# Patient Record
Sex: FEMALE
[De-identification: names, ages, dates, MRNs, and addresses within clinical notes are randomized per-mention and may not be internally consistent; named-entity substitution may affect disease eponyms.]

---

## 2021-07-27 ENCOUNTER — NURSE TRIAGE (OUTPATIENT)
Dept: OTHER | Facility: CLINIC | Age: 41
End: 2021-07-27

## 2021-07-27 NOTE — TELEPHONE ENCOUNTER
Brief description of triage: Caller complaints of pain, swelling and bruising in left and right shin, and right ankle and foot from a fall up a stair. Her shin struck harder on the right and it left a small bruise to each shin, then later swelling that became a bruise within the week. Triage indicates for patient to see PCP today. Caller has no PCP and was referred to [facility] today. Care advice provided, patient verbalizes understanding; denies any other questions or concerns; instructed to call back for any new or worsening symptoms. This triage is a result of a call to [address] [address]. Please do not respond to the triage nurse through this encounter. Any subsequent communication should be directly with the patient. Reason for Disposition   Patient wants to be seen    Answer Assessment - Initial Assessment Questions  1. MECHANISM: \"How did the injury happen? \" (e.g., twisting injury, direct blow)       A fall up the stairs and struck her shins on the edge of the stair    2. ONSET: \"When did the injury happen? \" (Minutes or hours ago)       1 week    3. LOCATION: \"Where is the injury located? \"       Right ankle and foot     4. APPEARANCE of INJURY: \"What does the injury look like? \"       Bruising, swollen and painful. New bruising on lateral side of right ankle    5. WEIGHT-BEARING: \"Can you put weight on that foot? \" \"Can you walk (four steps or more)? \"        Yes, pain is on the shin or ankle when applying direct pressure    6. SIZE: For cuts, bruises, or swelling, ask: \"How large is it? \" (e.g., inches or centimeters;  entire joint)       Bruising on right and left shin, right ankle and foot have multiple bruising    7. PAIN: \"Is there pain? \" If so, ask: \"How bad is the pain? \"    (e.g., Scale 1-10; or mild, moderate, severe)      3 mild pain    8. TETANUS: For any breaks in the skin, ask: \"When was the last tetanus booster? \"      N/a    9. OTHER SYMPTOMS: \"Do you have any other symptoms? \"       Denies    10. PREGNANCY: \"Is there any chance you are pregnant? \" \"When was your last menstrual period? \"        No    Protocols used: ANKLE AND FOOT INJURY-ADULT-[place]

## 2024-08-29 NOTE — PROGRESS NOTES
Subjective   Patient ID: Mabel Benton is a 44 y.o. female who presents for No chief complaint on file..  HPI  Pt presents for annual exam.  Pt states that had hysterectomy.  Still has right ovary.  May be having some hot flashes.  Pt would like to get into a FM doctor for BP issues and any other problems she might have.  No other problems.     Review of Systems  all other symptoms were found to be neg except for HPI/CC.      Objective   Physical Exam    General  General Appearance - normal build and Well groomed, Not in acute distress, No acute respiratory distress.  Mental Status - Alert.    Integumentary  - - warm and dry with no rashes.    Head and Neck  - - normalocephalic.    Eye  - - Bilateral - pupils equal and round and sclera clear.    Chest and Lung Exam  - - Bilateral - normal breathing effort.    Musculoskeletal  - - normal posture and normal gait and station.    Assessment/Plan   Diagnoses and all orders for this visit:  Women's annual routine gynecological examination  Screening mammogram for breast cancer  -     BI mammo bilateral screening tomosynthesis; Future  Screening for cholesterol level  -     Lipid Panel; Future  Screening for endocrine disorder  -     Comprehensive Metabolic Panel; Future  -     Hemoglobin A1C; Future  -     CBC; Future  -     TSH with reflex to Free T4 if abnormal; Future     Gave info on FM doctors  Pt to take BP's at home and then bring to FM appt  Ordered blood work  Ordered mamm  Pt is to F/U in 1 yr for annual exam or PRN.  Pt is to call with any questions of concerns.     Sharon Smith,  08/29/24 5:35 PM

## 2024-08-30 ENCOUNTER — OFFICE VISIT (OUTPATIENT)
Dept: OBSTETRICS AND GYNECOLOGY | Facility: CLINIC | Age: 44
End: 2024-08-30
Payer: COMMERCIAL

## 2024-08-30 VITALS
WEIGHT: 267.8 LBS | DIASTOLIC BLOOD PRESSURE: 128 MMHG | SYSTOLIC BLOOD PRESSURE: 167 MMHG | HEIGHT: 66 IN | BODY MASS INDEX: 43.04 KG/M2

## 2024-08-30 DIAGNOSIS — Z13.29 SCREENING FOR ENDOCRINE DISORDER: ICD-10-CM

## 2024-08-30 DIAGNOSIS — Z13.220 SCREENING FOR CHOLESTEROL LEVEL: ICD-10-CM

## 2024-08-30 DIAGNOSIS — Z12.31 SCREENING MAMMOGRAM FOR BREAST CANCER: ICD-10-CM

## 2024-08-30 DIAGNOSIS — N95.1 MENOPAUSAL SYNDROME: ICD-10-CM

## 2024-08-30 DIAGNOSIS — Z01.419 WOMEN'S ANNUAL ROUTINE GYNECOLOGICAL EXAMINATION: Primary | ICD-10-CM

## 2024-08-30 RX ORDER — DICLOFENAC SODIUM 75 MG/1
75 TABLET, DELAYED RELEASE ORAL 2 TIMES DAILY
COMMUNITY

## 2024-09-23 ENCOUNTER — LAB (OUTPATIENT)
Dept: LAB | Facility: LAB | Age: 44
End: 2024-09-23
Payer: COMMERCIAL

## 2024-09-23 DIAGNOSIS — Z13.220 SCREENING FOR CHOLESTEROL LEVEL: ICD-10-CM

## 2024-09-23 DIAGNOSIS — Z13.29 SCREENING FOR ENDOCRINE DISORDER: ICD-10-CM

## 2024-09-23 LAB
ALBUMIN SERPL BCP-MCNC: 4.1 G/DL (ref 3.4–5)
ALP SERPL-CCNC: 52 U/L (ref 33–110)
ALT SERPL W P-5'-P-CCNC: 8 U/L (ref 7–45)
ANION GAP SERPL CALC-SCNC: 13 MMOL/L (ref 10–20)
AST SERPL W P-5'-P-CCNC: 9 U/L (ref 9–39)
BILIRUB SERPL-MCNC: 0.5 MG/DL (ref 0–1.2)
BUN SERPL-MCNC: 11 MG/DL (ref 6–23)
CALCIUM SERPL-MCNC: 9.5 MG/DL (ref 8.6–10.6)
CHLORIDE SERPL-SCNC: 105 MMOL/L (ref 98–107)
CHOLEST SERPL-MCNC: 228 MG/DL (ref 0–199)
CHOLESTEROL/HDL RATIO: 4.9
CO2 SERPL-SCNC: 24 MMOL/L (ref 21–32)
CREAT SERPL-MCNC: 0.67 MG/DL (ref 0.5–1.05)
EGFRCR SERPLBLD CKD-EPI 2021: >90 ML/MIN/1.73M*2
ERYTHROCYTE [DISTWIDTH] IN BLOOD BY AUTOMATED COUNT: 12.9 % (ref 11.5–14.5)
EST. AVERAGE GLUCOSE BLD GHB EST-MCNC: 103 MG/DL
GLUCOSE SERPL-MCNC: 98 MG/DL (ref 74–99)
HBA1C MFR BLD: 5.2 %
HCT VFR BLD AUTO: 39.9 % (ref 36–46)
HDLC SERPL-MCNC: 46.7 MG/DL
HGB BLD-MCNC: 12.8 G/DL (ref 12–16)
LDLC SERPL CALC-MCNC: 131 MG/DL
MCH RBC QN AUTO: 27.8 PG (ref 26–34)
MCHC RBC AUTO-ENTMCNC: 32.1 G/DL (ref 32–36)
MCV RBC AUTO: 87 FL (ref 80–100)
NON HDL CHOLESTEROL: 181 MG/DL (ref 0–149)
NRBC BLD-RTO: 0 /100 WBCS (ref 0–0)
PLATELET # BLD AUTO: 397 X10*3/UL (ref 150–450)
POTASSIUM SERPL-SCNC: 4.4 MMOL/L (ref 3.5–5.3)
PROT SERPL-MCNC: 6.9 G/DL (ref 6.4–8.2)
RBC # BLD AUTO: 4.61 X10*6/UL (ref 4–5.2)
SODIUM SERPL-SCNC: 138 MMOL/L (ref 136–145)
TRIGL SERPL-MCNC: 253 MG/DL (ref 0–149)
TSH SERPL-ACNC: 0.94 MIU/L (ref 0.44–3.98)
VLDL: 51 MG/DL (ref 0–40)
WBC # BLD AUTO: 7.6 X10*3/UL (ref 4.4–11.3)

## 2024-09-23 PROCEDURE — 83036 HEMOGLOBIN GLYCOSYLATED A1C: CPT

## 2024-09-23 PROCEDURE — 85027 COMPLETE CBC AUTOMATED: CPT

## 2024-09-23 PROCEDURE — 84443 ASSAY THYROID STIM HORMONE: CPT

## 2024-09-23 PROCEDURE — 80053 COMPREHEN METABOLIC PANEL: CPT

## 2024-09-23 PROCEDURE — 36415 COLL VENOUS BLD VENIPUNCTURE: CPT

## 2024-09-23 PROCEDURE — 80061 LIPID PANEL: CPT

## 2024-09-24 ENCOUNTER — APPOINTMENT (OUTPATIENT)
Dept: PRIMARY CARE | Facility: CLINIC | Age: 44
End: 2024-09-24
Payer: COMMERCIAL

## 2024-09-25 ENCOUNTER — APPOINTMENT (OUTPATIENT)
Dept: PRIMARY CARE | Facility: CLINIC | Age: 44
End: 2024-09-25
Payer: COMMERCIAL

## 2024-09-25 ENCOUNTER — HOSPITAL ENCOUNTER (OUTPATIENT)
Dept: RADIOLOGY | Facility: CLINIC | Age: 44
Discharge: HOME | End: 2024-09-25
Payer: COMMERCIAL

## 2024-09-25 ENCOUNTER — LAB (OUTPATIENT)
Dept: LAB | Facility: LAB | Age: 44
End: 2024-09-25
Payer: COMMERCIAL

## 2024-09-25 VITALS
TEMPERATURE: 98.1 F | SYSTOLIC BLOOD PRESSURE: 152 MMHG | HEART RATE: 64 BPM | WEIGHT: 265 LBS | RESPIRATION RATE: 20 BRPM | BODY MASS INDEX: 42.59 KG/M2 | DIASTOLIC BLOOD PRESSURE: 102 MMHG | HEIGHT: 66 IN

## 2024-09-25 DIAGNOSIS — R05.3 PERSISTENT COUGH: ICD-10-CM

## 2024-09-25 DIAGNOSIS — I10 PRIMARY HYPERTENSION: ICD-10-CM

## 2024-09-25 DIAGNOSIS — E78.2 MIXED HYPERLIPIDEMIA: ICD-10-CM

## 2024-09-25 DIAGNOSIS — Z23 NEED FOR VACCINATION: ICD-10-CM

## 2024-09-25 DIAGNOSIS — Z13.31 SCREENING FOR DEPRESSION: ICD-10-CM

## 2024-09-25 DIAGNOSIS — Z00.00 ENCOUNTER FOR ANNUAL PHYSICAL EXAM: Primary | ICD-10-CM

## 2024-09-25 DIAGNOSIS — Z00.00 ENCOUNTER FOR ANNUAL PHYSICAL EXAM: ICD-10-CM

## 2024-09-25 DIAGNOSIS — E66.01 MORBID OBESITY WITH BMI OF 40.0-44.9, ADULT (MULTI): ICD-10-CM

## 2024-09-25 PROBLEM — H53.9 VISUAL DISTURBANCE: Status: RESOLVED | Noted: 2024-09-25 | Resolved: 2024-09-25
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
## 2024-09-25 PROBLEM — J04.0 LARYNGITIS: Status: RESOLVED | Noted: 2024-09-25 | Resolved: 2024-09-25

## 2024-09-25 PROBLEM — G43.109 OCULAR MIGRAINE: Status: ACTIVE | Noted: 2024-09-25

## 2024-09-25 PROBLEM — R51.9 HEADACHE: Status: RESOLVED | Noted: 2018-10-13 | Resolved: 2024-09-25

## 2024-09-25 PROBLEM — H52.7 REFRACTIVE ERROR: Status: RESOLVED | Noted: 2024-09-25 | Resolved: 2024-09-25

## 2024-09-25 PROBLEM — R03.0 ELEVATED BLOOD PRESSURE READING IN OFFICE WITHOUT DIAGNOSIS OF HYPERTENSION: Status: ACTIVE | Noted: 2024-09-25

## 2024-09-25 PROBLEM — M25.562 LEFT KNEE PAIN: Status: RESOLVED | Noted: 2024-07-15 | Resolved: 2024-09-25

## 2024-09-25 PROBLEM — G43.109 OCULAR MIGRAINE: Status: RESOLVED | Noted: 2024-09-25 | Resolved: 2024-09-25

## 2024-09-25 PROBLEM — S80.10XA CONTUSION OF LOWER LEG: Status: RESOLVED | Noted: 2024-09-25 | Resolved: 2024-09-25

## 2024-09-25 LAB — 25(OH)D3 SERPL-MCNC: 10 NG/ML (ref 30–100)

## 2024-09-25 PROCEDURE — 90472 IMMUNIZATION ADMIN EACH ADD: CPT | Performed by: FAMILY MEDICINE

## 2024-09-25 PROCEDURE — 96127 BRIEF EMOTIONAL/BEHAV ASSMT: CPT | Performed by: FAMILY MEDICINE

## 2024-09-25 PROCEDURE — 90471 IMMUNIZATION ADMIN: CPT | Performed by: FAMILY MEDICINE

## 2024-09-25 PROCEDURE — 36415 COLL VENOUS BLD VENIPUNCTURE: CPT

## 2024-09-25 PROCEDURE — 90715 TDAP VACCINE 7 YRS/> IM: CPT | Performed by: FAMILY MEDICINE

## 2024-09-25 PROCEDURE — 82306 VITAMIN D 25 HYDROXY: CPT

## 2024-09-25 PROCEDURE — 99386 PREV VISIT NEW AGE 40-64: CPT | Performed by: FAMILY MEDICINE

## 2024-09-25 PROCEDURE — 71046 X-RAY EXAM CHEST 2 VIEWS: CPT | Performed by: RADIOLOGY

## 2024-09-25 PROCEDURE — 99203 OFFICE O/P NEW LOW 30 MIN: CPT | Performed by: FAMILY MEDICINE

## 2024-09-25 PROCEDURE — 90656 IIV3 VACC NO PRSV 0.5 ML IM: CPT | Performed by: FAMILY MEDICINE

## 2024-09-25 PROCEDURE — 71046 X-RAY EXAM CHEST 2 VIEWS: CPT

## 2024-09-25 RX ORDER — BENZONATATE 100 MG/1
100 CAPSULE ORAL 3 TIMES DAILY PRN
Qty: 42 CAPSULE | Refills: 0 | Status: SHIPPED | OUTPATIENT
Start: 2024-09-25 | End: 2024-10-25

## 2024-09-25 RX ORDER — LISINOPRIL 10 MG/1
10 TABLET ORAL DAILY
Qty: 90 TABLET | Refills: 0 | Status: SHIPPED | OUTPATIENT
Start: 2024-09-25 | End: 2024-12-24

## 2024-09-25 ASSESSMENT — ENCOUNTER SYMPTOMS
COUGH: 1
SEIZURES: 0
FATIGUE: 0
SORE THROAT: 0
RHINORRHEA: 0
FEVER: 0
DIARRHEA: 0
NAUSEA: 0
DYSPHORIC MOOD: 0
DIFFICULTY URINATING: 0
NERVOUS/ANXIOUS: 0
WHEEZING: 0
HEMATURIA: 0
CONSTIPATION: 0
VOMITING: 0
PALPITATIONS: 0
ARTHRALGIAS: 1
BLOOD IN STOOL: 0
BRUISES/BLEEDS EASILY: 0
SHORTNESS OF BREATH: 1

## 2024-09-25 ASSESSMENT — PATIENT HEALTH QUESTIONNAIRE - PHQ9
2. FEELING DOWN, DEPRESSED OR HOPELESS: NOT AT ALL
1. LITTLE INTEREST OR PLEASURE IN DOING THINGS: NOT AT ALL
SUM OF ALL RESPONSES TO PHQ9 QUESTIONS 1 & 2: 0

## 2024-09-25 NOTE — PROGRESS NOTES
"Subjective   Patient ID: Mabel Benton is a 44 y.o. female who presents for Hypertension (Np. Pts BP has been elevated. She stopped the diclofenac for her knee to see if it was causing the high bp. Her labs that GYN recently ordered showed high cholesterol.) and Cough (Cough for about a month. Sometimes wet but can't cough it up. Pt feels like something is in her lungs. Worse when she lays down. She has been SOB/winded about a month weeks before the cough started. ).    HPI     Review of Systems   Constitutional:  Negative for fatigue and fever.   HENT:  Negative for congestion, ear discharge, ear pain, hearing loss, rhinorrhea and sore throat.    Eyes:  Negative for visual disturbance.   Respiratory:  Positive for cough and shortness of breath. Negative for wheezing.         Duration 2 wk cough  Home covid negative   Cardiovascular:  Negative for chest pain and palpitations.   Gastrointestinal:  Negative for blood in stool, constipation, diarrhea, nausea and vomiting.   Endocrine: Negative for cold intolerance and heat intolerance.   Genitourinary:  Negative for difficulty urinating, hematuria, vaginal bleeding and vaginal discharge.   Musculoskeletal:  Positive for arthralgias.        Left knee pain ,sees ortho   Skin:  Negative for rash.   Neurological:  Negative for seizures and syncope.        Pt fainted due to anemia, remotely   Hematological:  Does not bruise/bleed easily.   Psychiatric/Behavioral:  Negative for dysphoric mood and suicidal ideas. The patient is not nervous/anxious.        Objective   BP (!) 152/102   Pulse 64   Temp 36.7 °C (98.1 °F)   Resp 20   Ht 1.664 m (5' 5.5\")   Wt 120 kg (265 lb)   BMI 43.43 kg/m²     Physical Exam  Vitals and nursing note reviewed.   Constitutional:       General: She is not in acute distress.     Appearance: She is obese.   HENT:      Head: Normocephalic and atraumatic.      Right Ear: Tympanic membrane, ear canal and external ear normal.      Left Ear: " Tympanic membrane, ear canal and external ear normal.      Nose: Nose normal.      Mouth/Throat:      Mouth: Mucous membranes are moist.      Pharynx: Oropharynx is clear.   Eyes:      Extraocular Movements: Extraocular movements intact.      Conjunctiva/sclera: Conjunctivae normal.      Pupils: Pupils are equal, round, and reactive to light.   Cardiovascular:      Rate and Rhythm: Normal rate and regular rhythm.      Heart sounds: Normal heart sounds.   Pulmonary:      Effort: Pulmonary effort is normal. No respiratory distress.      Breath sounds: Normal breath sounds. No wheezing, rhonchi or rales.   Abdominal:      General: Bowel sounds are normal.      Palpations: Abdomen is soft. There is no mass.      Tenderness: There is no abdominal tenderness. There is no right CVA tenderness or left CVA tenderness.   Musculoskeletal:         General: No deformity.      Cervical back: Neck supple.   Lymphadenopathy:      Cervical: No cervical adenopathy.   Skin:     General: Skin is warm and dry.   Neurological:      General: No focal deficit present.      Mental Status: She is alert.      Sensory: No sensory deficit.      Motor: No weakness.      Gait: Gait normal.   Psychiatric:         Mood and Affect: Mood normal.         Behavior: Behavior normal.         Assessment/Plan   Problem List Items Addressed This Visit             ICD-10-CM    Encounter for annual physical exam - Primary Z00.00     Pt ob gyn ordered bw, will check vit d         Relevant Orders    Vitamin D 25-Hydroxy,Total    Follow Up In [facility] - PCP - Health Maintenance    Screening for depression Z13.31    Morbid obesity with BMI of 40.0-44.9, adult (Multi) E66.01, Z68.41     Advise healthy diet and exercise  Ho printed         Need for vaccination Z23    Relevant Orders    Flu vaccine, trivalent, preservative free, age 6 months and greater (Fluraix/Fluzone/Flulaval)    Tdap vaccine, age 7 years and older    Primary hypertension I10     Pt  stopped nsaid  Pt with hx of htn  Home bp has been elevated  Will start lisinopril 10 mg daily  F/up 1 month         Relevant Medications    lisinopril 10 mg tablet    Other Relevant Orders    Follow Up In [facility] - PCP - Established    Persistent cough R05.3    Relevant Medications    benzonatate (Tessalon) 100 mg capsule    Other Relevant Orders    XR chest 2 views    Mixed hyperlipidemia E78.2     Pt to start a low fat/chol diet  Exercise most days   Will recheck lipids in 6 mo

## 2024-09-25 NOTE — ASSESSMENT & PLAN NOTE
Pt stopped nsaid  Pt with hx of htn  Home bp has been elevated  Will start lisinopril 10 mg daily  F/up 1 month

## 2024-09-27 ENCOUNTER — TELEPHONE (OUTPATIENT)
Dept: PRIMARY CARE | Facility: CLINIC | Age: 44
End: 2024-09-27
Payer: COMMERCIAL

## 2024-09-27 DIAGNOSIS — I51.7 CARDIOMEGALY: ICD-10-CM

## 2024-09-27 DIAGNOSIS — E55.9 VITAMIN D DEFICIENCY: ICD-10-CM

## 2024-09-27 NOTE — TELEPHONE ENCOUNTER
Can you please help pt schedule ECHO. I placed the order. Also, she will have to get blood drawn again because the lab could not add the additional blood test to the tubes they already jose alberto.

## 2024-09-27 NOTE — TELEPHONE ENCOUNTER
----- Message from Portia Astudillo sent at 9/27/2024  7:40 AM EDT -----  Call pt, cxr showed no lung abn, has mild cardiomegaly which can be due to uncontroled htn, check BNP and echo

## 2024-09-27 NOTE — TELEPHONE ENCOUNTER
----- Message from Portia Astudillo sent at 9/26/2024  8:44 AM EDT -----  PT WITH LOW VIT D. START VIT D 50,000 international units  WEEKLY X 12 WK. THEN SWITCH TO OTC VIT D 400 international units  DAILY

## 2024-09-28 RX ORDER — ERGOCALCIFEROL 1.25 MG/1
50000 CAPSULE ORAL WEEKLY
Qty: 12 CAPSULE | Refills: 0 | Status: SHIPPED | OUTPATIENT
Start: 2024-09-28 | End: 2024-12-21

## 2024-10-01 ENCOUNTER — APPOINTMENT (OUTPATIENT)
Dept: PRIMARY CARE | Facility: CLINIC | Age: 44
End: 2024-10-01
Payer: COMMERCIAL

## 2024-10-09 ENCOUNTER — LAB (OUTPATIENT)
Dept: LAB | Facility: LAB | Age: 44
End: 2024-10-09
Payer: COMMERCIAL

## 2024-10-09 DIAGNOSIS — I51.7 CARDIOMEGALY: ICD-10-CM

## 2024-10-09 LAB — BNP SERPL-MCNC: 72 PG/ML (ref 0–99)

## 2024-10-09 PROCEDURE — 36415 COLL VENOUS BLD VENIPUNCTURE: CPT

## 2024-10-09 PROCEDURE — 83880 ASSAY OF NATRIURETIC PEPTIDE: CPT

## 2024-10-17 DIAGNOSIS — I10 PRIMARY HYPERTENSION: ICD-10-CM

## 2024-10-18 RX ORDER — LISINOPRIL 10 MG/1
10 TABLET ORAL DAILY
Qty: 90 TABLET | Refills: 1 | Status: SHIPPED | OUTPATIENT
Start: 2024-10-18

## 2024-10-20 DIAGNOSIS — E55.9 VITAMIN D DEFICIENCY: ICD-10-CM

## 2024-10-20 DIAGNOSIS — R93.1 ABNORMAL ECHOCARDIOGRAM: ICD-10-CM

## 2024-10-22 ENCOUNTER — HOSPITAL ENCOUNTER (OUTPATIENT)
Dept: CARDIOLOGY | Facility: CLINIC | Age: 44
Discharge: HOME | End: 2024-10-22
Payer: COMMERCIAL

## 2024-10-22 DIAGNOSIS — I51.7 CARDIOMEGALY: ICD-10-CM

## 2024-10-22 LAB
AORTIC VALVE PEAK VELOCITY: 1.35 M/S
AV PEAK GRADIENT: 7.3 MMHG
AVA (PEAK VEL): 3.83 CM2
EJECTION FRACTION APICAL 4 CHAMBER: 47.8
EJECTION FRACTION: 41 %
LEFT ATRIUM VOLUME AREA LENGTH INDEX BSA: 30.4 ML/M2
LEFT VENTRICLE INTERNAL DIMENSION DIASTOLE: 5.26 CM (ref 3.5–6)
LEFT VENTRICULAR OUTFLOW TRACT DIAMETER: 2.38 CM
MITRAL VALVE E/A RATIO: 0.85
RIGHT VENTRICLE FREE WALL PEAK S': 12 CM/S
RIGHT VENTRICLE PEAK SYSTOLIC PRESSURE: 32.5 MMHG
TRICUSPID ANNULAR PLANE SYSTOLIC EXCURSION: 2.3 CM

## 2024-10-22 PROCEDURE — 93325 DOPPLER ECHO COLOR FLOW MAPG: CPT

## 2024-10-22 PROCEDURE — 93321 DOPPLER ECHO F-UP/LMTD STD: CPT | Performed by: INTERNAL MEDICINE

## 2024-10-22 PROCEDURE — 93325 DOPPLER ECHO COLOR FLOW MAPG: CPT | Performed by: INTERNAL MEDICINE

## 2024-10-22 PROCEDURE — 93308 TTE F-UP OR LMTD: CPT | Performed by: INTERNAL MEDICINE

## 2024-10-23 RX ORDER — ERGOCALCIFEROL 1.25 MG/1
50000 CAPSULE ORAL
Qty: 24 CAPSULE | Refills: 1 | OUTPATIENT
Start: 2024-10-27

## 2024-10-30 ENCOUNTER — APPOINTMENT (OUTPATIENT)
Dept: PRIMARY CARE | Facility: CLINIC | Age: 44
End: 2024-10-30
Payer: COMMERCIAL

## 2024-10-30 VITALS
TEMPERATURE: 98.2 F | BODY MASS INDEX: 40.82 KG/M2 | RESPIRATION RATE: 20 BRPM | HEIGHT: 66 IN | SYSTOLIC BLOOD PRESSURE: 112 MMHG | HEART RATE: 84 BPM | WEIGHT: 254 LBS | DIASTOLIC BLOOD PRESSURE: 80 MMHG

## 2024-10-30 DIAGNOSIS — I10 PRIMARY HYPERTENSION: Primary | ICD-10-CM

## 2024-10-30 PROCEDURE — 99213 OFFICE O/P EST LOW 20 MIN: CPT | Performed by: FAMILY MEDICINE

## 2024-10-30 RX ORDER — ACETAMINOPHEN 500 MG
TABLET ORAL
Qty: 1 KIT | Refills: 0 | Status: SHIPPED | OUTPATIENT
Start: 2024-10-30

## 2024-10-30 ASSESSMENT — ENCOUNTER SYMPTOMS
DIFFICULTY URINATING: 0
DIZZINESS: 0
CONSTIPATION: 0
LIGHT-HEADEDNESS: 0
FATIGUE: 0
SHORTNESS OF BREATH: 0
VOMITING: 0
DIARRHEA: 0
NAUSEA: 0
PALPITATIONS: 0

## 2024-12-13 ENCOUNTER — APPOINTMENT (OUTPATIENT)
Dept: CARDIOLOGY | Facility: CLINIC | Age: 44
End: 2024-12-13
Payer: COMMERCIAL

## 2024-12-13 VITALS
SYSTOLIC BLOOD PRESSURE: 124 MMHG | HEART RATE: 94 BPM | BODY MASS INDEX: 40.34 KG/M2 | HEIGHT: 66 IN | WEIGHT: 251 LBS | DIASTOLIC BLOOD PRESSURE: 82 MMHG

## 2024-12-13 DIAGNOSIS — I10 PRIMARY HYPERTENSION: ICD-10-CM

## 2024-12-13 DIAGNOSIS — I42.9 CARDIOMYOPATHY, UNSPECIFIED TYPE (MULTI): ICD-10-CM

## 2024-12-13 DIAGNOSIS — I51.7 CARDIOMEGALY: Primary | ICD-10-CM

## 2024-12-13 DIAGNOSIS — E78.2 MIXED HYPERLIPIDEMIA: ICD-10-CM

## 2024-12-13 DIAGNOSIS — E78.5 DYSLIPIDEMIA: ICD-10-CM

## 2024-12-13 DIAGNOSIS — R94.31 ABNORMAL EKG: ICD-10-CM

## 2024-12-13 PROBLEM — Z23 NEED FOR VACCINATION: Status: RESOLVED | Noted: 2024-09-25 | Resolved: 2024-12-13

## 2024-12-13 PROCEDURE — 1036F TOBACCO NON-USER: CPT | Performed by: INTERNAL MEDICINE

## 2024-12-13 PROCEDURE — 3079F DIAST BP 80-89 MM HG: CPT | Performed by: INTERNAL MEDICINE

## 2024-12-13 PROCEDURE — 99205 OFFICE O/P NEW HI 60 MIN: CPT | Performed by: INTERNAL MEDICINE

## 2024-12-13 PROCEDURE — 3008F BODY MASS INDEX DOCD: CPT | Performed by: INTERNAL MEDICINE

## 2024-12-13 PROCEDURE — 93000 ELECTROCARDIOGRAM COMPLETE: CPT | Performed by: INTERNAL MEDICINE

## 2024-12-13 PROCEDURE — 3074F SYST BP LT 130 MM HG: CPT | Performed by: INTERNAL MEDICINE

## 2024-12-13 RX ORDER — METOPROLOL SUCCINATE 25 MG/1
25 TABLET, EXTENDED RELEASE ORAL DAILY
Qty: 90 TABLET | Refills: 3 | Status: SHIPPED | OUTPATIENT
Start: 2024-12-13 | End: 2025-12-13

## 2024-12-13 RX ORDER — BISMUTH SUBSALICYLATE 262 MG
1 TABLET,CHEWABLE ORAL DAILY
COMMUNITY

## 2024-12-13 RX ORDER — OMEGA-3S/DHA/EPA/FISH OIL 1000-1400
CAPSULE,DELAYED RELEASE (ENTERIC COATED) ORAL
COMMUNITY

## 2024-12-13 ASSESSMENT — ENCOUNTER SYMPTOMS
PALPITATIONS: 1
ARTHRALGIAS: 1
GASTROINTESTINAL NEGATIVE: 1

## 2024-12-13 NOTE — PROGRESS NOTES
Patient:  Mabel Benton  YOB: 1980  MRN: [id number]       Chief Complaint/Active Symptoms:       Mabel Benton is a 44 y.o. female who returns today for cardiac follow-up.          Review of Systems    Objective:     Vitals:    12/13/24 0817   BP: 124/82   Pulse: 94       Vitals:    12/13/24 0817   Weight: 114 kg (251 lb)       Allergies:     No Known Allergies       Medications:     Current Outpatient Medications   Medication Instructions   • blood pressure monitor kit Take bp readings 2-3 x/wk initially , when stable, take bp 1x/wk   • ergocalciferol (VITAMIN D-2) 50,000 Units, oral, Weekly   • inulin (Fiber Gummies) 2 gram tablet,chewable Chew.   • lisinopril 10 mg, oral, Daily   • multivitamin tablet 1 tablet, Daily       Physical Examination:   GENERAL:  Well developed, well nourished, in no acute distress.  HEENT: NC AT, EOMI with anicteric sclera  NECK:  Supple, no JVD, no bruit.  CHEST:  Symmetric and nontender.  LUNGS:  Clear to auscultation bilaterally, normal respiratory effort.  HEART:  PMI is nondisplaced. RRR with normal S1 and S2, no S3, no mumur or rub. No carotid or abdominal bruits  ABDOMEN: Soft, NT, ND without palpable organomegaly or bruits  EXTREMITIES:  Warm with good color, no clubbing or cyanosis.  There is no edema noted.  PERIPHERAL VASCULAR:  Pulses present and equally palpable; 2+ throughout.  MUSCULOSKELETAL:   NEURO/PSYCH:  Alert and oriented times three with approppriate behavior and responses. Nonfocal motor examination with normal gait and ambulation  Lymph: No significant palpable lymphadenopathy  Skin: no rash or lesions on exposed skin or reported.    Lab:     CBC:   Lab Results   Component Value Date    WBC 7.6 09/23/2024    RBC 4.61 09/23/2024    HGB 12.8 09/23/2024    HCT 39.9 09/23/2024     09/23/2024        CMP:    Lab Results   Component Value Date     09/23/2024    K 4.4 09/23/2024     09/23/2024    CO2 24 09/23/2024    BUN 11  "09/23/2024    CREATININE 0.67 09/23/2024    GLUCOSE 98 09/23/2024    CALCIUM 9.5 09/23/2024       Magnesium:    No results found for: \"MG\"    Lipid Profile:    Lab Results   Component Value Date    TRIG 253 (H) 09/23/2024    HDL 46.7 09/23/2024    LDLCALC 131 (H) 09/23/2024       TSH:    Lab Results   Component Value Date    TSH 0.94 09/23/2024       BNP:   Lab Results   Component Value Date    BNP 72 10/09/2024        PT/INR:    No results found for: \"PROTIME\", \"INR\"    HgBA1c:    Lab Results   Component Value Date    HGBA1C 5.2 09/23/2024       BMP:  Lab Results   Component Value Date     09/23/2024     05/30/2019     05/21/2019    K 4.4 09/23/2024    K 4.5 05/30/2019    K 4.4 05/21/2019     09/23/2024     05/30/2019     05/21/2019    CO2 24 09/23/2024    CO2 22 05/30/2019    CO2 25 05/21/2019    BUN 11 09/23/2024    BUN 12 05/30/2019    BUN 12 05/21/2019    CREATININE 0.67 09/23/2024    CREATININE 0.77 05/30/2019    CREATININE 0.61 05/21/2019       Cardiac Enzymes:    No results found for: \"TROPHS\"    Hepatic Function Panel:    Lab Results   Component Value Date    ALKPHOS 52 09/23/2024    ALT 8 09/23/2024    AST 9 09/23/2024    PROT 6.9 09/23/2024    BILITOT 0.5 09/23/2024         Diagnostic Studies:     Transthoracic Echo Limited    Result Date: 10/22/2024   [facility], [address] [place] [address]             Tel [phone] and Fax [phone] TRANSTHORACIC ECHOCARDIOGRAM REPORT  Patient Name:      KRAIG Veronica Physician:    [id number] Juan Pablo Neff MD Study Date:        10/22/2024           Ordering Provider:    [id number] SAMMY KENT MRN/PID:           [id number]             Fellow: Accession#:        [id number]         Nurse: Date of Birth/Age: 1980 / 44 years Sonographer:          Kellen Babb RDCS Gender:         "    F                    Additional Staff: Height:            165.10 cm            Admit Date: Weight:            120.20 kg            Admission Status:     Outpatient BSA / BMI:         2.23 m2 / 44.10      Encounter#:           [id number]                    kg/m2 Blood Pressure:    152/89 mmHg          Department Location:  [facility] Study Type:    TRANSTHORACIC ECHO (TTE) LIMITED Diagnosis/ICD: Ventricular enlargement-I51.7 Indication:    Cardiomegaly CPT Code:      Echo Limited-74591; Color Doppler-50313; Doppler Limited-95721 Patient History: Pertinent History: HTN and Hyperlipidemia. obesity. Study Detail: The following Echo studies were performed: 2D, M-Mode, Doppler and               color flow. Technically challenging study due to body habitus.  PHYSICIAN INTERPRETATION: Left Ventricle: The left ventricular systolic function is mildly decreased, with a Bah's biplane calculated ejection fraction of 41%. There is global hypokinesis of the left ventricle with minor regional variations. The left ventricular cavity size is mildly dilated. Spectral Doppler shows a normal pattern of left ventricular diastolic filling. Left Atrium: The left atrium is normal in size. Right Ventricle: The right ventricle is normal in size. There is normal right ventricular global systolic function. Right Atrium: The right atrium is normal in size. Aortic Valve: The aortic valve is trileaflet. There is no evidence of aortic valve regurgitation. The peak instantaneous gradient of the aortic valve is 7.3 mmHg. Mitral Valve: The mitral valve is normal in structure. There is trace to mild mitral valve regurgitation. Tricuspid Valve: The tricuspid valve is structurally normal. There is trace to mild tricuspid regurgitation. The Doppler estimated RVSP is slightly elevated at 32.5 mmHg. Pulmonic Valve: The pulmonic valve was not assessed. Pulmonic valve regurgitation was not assessed. Pericardium: There is no pericardial effusion  noted. Aorta: The aortic root is abnormal. There is mild dilatation of the aortic root. Systemic Veins: The inferior vena cava appears normal in size, with IVC inspiratory collapse greater than 50%. In comparison to the previous echocardiogram(s): There are no prior studies on this patient for comparison purposes.  CONCLUSIONS:  1. The left ventricular systolic function is mildly decreased, with a Bah's biplane calculated ejection fraction of 41%.  2. There is global hypokinesis of the left ventricle with minor regional variations.  3. Left ventricular cavity size is mildly dilated.  4. There is normal right ventricular global systolic function.  5. Slightly elevated right ventricular systolic pressure. QUANTITATIVE DATA SUMMARY:  2D MEASUREMENTS:          Normal Ranges: Ao Root d:       3.80 cm  (2.0-3.7cm) LAs:             4.11 cm  (2.7-4.0cm) RVIDd:           3.13 cm  (0.9-3.6cm) IVSd:            1.01 cm  (0.6-1.1cm) LVPWd:           0.73 cm  (0.6-1.1cm) LVIDd:           5.26 cm  (3.9-5.9cm) LVIDs:           4.31 cm LV Mass Index:   74 g/m2 LVEDV Index:     77 ml/m2 LV % FS          18.0 %  LA VOLUME:                    Normal Ranges: LA Vol A4C:        64.2 ml    (22+/-6mL/m2) LA Vol A2C:        68.8 ml LA Vol BP:         67.7 ml LA Vol Index A4C:  28.8ml/m2 LA Vol Index A2C:  30.9 ml/m2 LA Vol Index BP:   30.4 ml/m2 LA Area A4C:       20.0 cm2 LA Area A2C:       21.1 cm2 LA Major Axis A4C: 5.3 cm LA Major Axis A2C: 5.5 cm LA Vol A4C:        56.9 ml LA Vol A2C:        66.2 ml LA Vol Index BSA:  27.6 ml/m2  RA VOLUME BY A/L METHOD:          Normal Ranges: RA Area A4C:             15.5 cm2  M-MODE MEASUREMENTS:         Normal Ranges: AoV Exc:             2.20 cm (1.5-2.5cm)  AORTA MEASUREMENTS:         Normal Ranges: AoV Exc:            2.20 cm (1.5-2.5cm) Asc Ao, d:          3.30 cm (2.1-3.4cm)  LV SYSTOLIC FUNCTION BY 2D PLANIMETRY (MOD):                      Normal Ranges: EF-A4C View:    48 % (>=55%)  "EF-A2C View:    32 % EF-Biplane:     41 % LV EF Reported: 41 %  LV DIASTOLIC FUNCTION:             Normal Ranges: MV Peak E:             0.77 m/s    (0.7-1.2 m/s) MV Peak A:             0.90 m/s    (0.42-0.7 m/s) E/A Ratio:             0.85        (1.0-2.2) MV e'                  0.065 m/s   (>8.0) MV lateral e'          0.07 m/s MV medial e'           0.06 m/s MV A Dur:              103.81 msec E/e' Ratio:            11.82       (<8.0) a'                     0.12 m/s MV DT:                 81 msec     (150-240 msec)  MITRAL VALVE:         Normal Ranges: MV DT:        81 msec (150-240msec)  AORTIC VALVE:           Normal Ranges: AoV Vmax:      1.35 m/s (<=1.7m/s) AoV Peak P.3 mmHg (<20mmHg) LVOT Max Cristobal:  1.16 m/s (<=1.1m/s) LVOT VTI:      22.12 cm LVOT Diameter: 2.38 cm  (1.8-2.4cm) AoV Area,Vmax: 3.83 cm2 (2.5-4.5cm2)  RIGHT VENTRICLE: RV Basal 4.00 cm RV Mid   3.10 cm RV Major 6.5 cm TAPSE:   23.0 mm RV s'    0.12 m/s  TRICUSPID VALVE/RVSP:          Normal Ranges: Peak TR Velocity:     2.72 m/s RV Syst Pressure:     33 mmHg  (< 30mmHg) IVC Diam:             1.90 cm  PULMONIC VALVE:          Normal Ranges: PV Max Cristobal:     1.1 m/s  (0.6-0.9m/s) PV Max P.1 mmHg  AORTA: Asc Ao Diam 3.35 cm  [id number] Juan Pablo Neff MD Electronically signed on 10/22/2024 at 9:48:34 AM  ** Final **      No nuclear medicine results found for the past 12 months    No valid procedures specified.    EKG:   No results found for: \"EKG\"    Radiology:     No orders to display         ASSESSMENT     {Assess/Plan SmartLinks:[id number]::\"***\"}    PLAN                   "

## 2024-12-13 NOTE — PROGRESS NOTES
CARDIOLOGY NEW PATIENT OFFICE VISIT    Patient:  Mabel Benton  YOB: 1980  MRN: [id number]       Chief Complaint/Active Symptoms:       Mabel Benton is a 44 y.o. female who is being seen today at the request of  for evaluation of cardiomegaly/cardiomyopathy.    Chief Complaint   Patient presents with    [facility]     Patient present to discuss echo result       History of Present Illness:   HPI    Patient here after having a CXR for cough several months ago demonstrated cardiomegaly then echo demonstrated mild/moderate global LV dysfunction. The only symptom the patient had was an awareness of her heart beating hard that has subsequently resolved when starting on BP medication. The only other symptom was an elevated HR at rest (110's) that with treatment of her HTN has also declined and now in the 80's predominantly.     In hindsight, the patient feels she was probably SOB when she had the cough back around Labor Day. No history of chest pain or discomfort. Has had some occasional ankle edema which has since resolved. No orthopnea or PND.     Did not have COVID around Labor day when she was first known to have symptoms. Only tested + for COVID back in Spring of 2021. Last had covid vaccination 1 month ago.       No Known Allergies     Outpatient Medications:     Current Outpatient Medications   Medication Instructions    ergocalciferol (VITAMIN D-2) 50,000 Units, oral, Weekly    inulin (Fiber Gummies) 2 gram tablet,chewable Chew.    lisinopril 10 mg, oral, Daily    multivitamin tablet 1 tablet, Daily        Past Medical History:     Past Medical History:   Diagnosis Date    Accidental fall 09/25/2024    Contusion of lower leg 09/25/2024    Headache 10/13/2018    Laryngitis 09/25/2024    Refractive error 09/25/2024       Social History:     Social History     Socioeconomic History    Marital status: Single   Tobacco Use    Smoking status: Never    Smokeless tobacco: Never   Vaping Use     Vaping status: Never Used   Substance and Sexual Activity    Alcohol use: Yes     Alcohol/week: 0.0 - 1.0 standard drinks of alcohol    Drug use: Never       Family History:        Family History   Problem Relation Name Age of Onset    Lung cancer Mother      Diabetes Father      Uterine cancer Mother's Sister      Heart disease Paternal Grandmother  29       Review of Systems:     Review of Systems   Cardiovascular:  Positive for palpitations. Negative for chest pain and leg swelling.   Gastrointestinal: Negative.    Genitourinary: Negative.    Musculoskeletal:  Positive for arthralgias (left knee).   All other systems reviewed and are negative.      Objective:     Vitals:    12/13/24 0817   BP: 124/82   Pulse: 94       Vitals:    12/13/24 0817   Weight: 114 kg (251 lb)       Physical Examination:   GENERAL:  Well developed, well nourished, in no acute distress.  HEENT: NC AT EOMI with anicteric sclera  NECK:  Supple, no JVD, no bruit.  CHEST:  Symmetric and nontender.  LUNGS:  Normal respiratory effort. Bilateral breath sounds clear to auscultation.   HEART:  PMI nondisplaced. Rate and rhythm regular with no evident murmur, no gallop appreciated. There are no rubs, clicks or heaves.  PERIPHERAL VASCULAR:  Pulses present and equally palpable; 2+ throughout.  ABDOMEN: Soft, NT, ND without HSM or palpable organomegaly, no bruits, normoactive bowel sounds  MUSCULOSKELETAL: No significant joint or limb deformity  EXTREMITIES:  Warm with good color, no clubbing or cyanosis.  There is no edema noted.  NEURO/PSYCH:  Alert and oriented times three with approppriate behavior and responses.  LYMPH: no significant palpable lymphadenopathy  SKIN: No rashes on exposed skin, no reported skin lesions.     Lab:     CBC:   Lab Results   Component Value Date    WBC 7.6 09/23/2024    RBC 4.61 09/23/2024    HGB 12.8 09/23/2024    HCT 39.9 09/23/2024     09/23/2024      Lab Results   Component Value Date    WBC 7.6 09/23/2024  "   WBC 8.1 07/09/2019    WBC 11.5 (H) 05/31/2019    WBC 14.5 (H) 05/30/2019    RBC 4.61 09/23/2024    RBC 5.01 07/09/2019    RBC 3.63 (L) 05/31/2019    RBC 3.96 (L) 05/30/2019    HGB 12.8 09/23/2024    HGB 10.6 (L) 07/09/2019    HGB 6.9 (L) 05/31/2019    HGB 7.5 (L) 05/30/2019    HCT 39.9 09/23/2024    HCT 36.4 07/09/2019    HCT 25.1 (L) 05/31/2019    HCT 26.8 (L) 05/30/2019    MCV 87 09/23/2024    MCV 73 (L) 07/09/2019    MCV 69 (L) 05/31/2019    MCV 68 (L) 05/30/2019    MCH 27.8 09/23/2024    MCHC 32.1 09/23/2024    MCHC 29.1 (L) 07/09/2019    MCHC 27.5 (L) 05/31/2019    MCHC 28.0 (L) 05/30/2019    RDW 12.9 09/23/2024    RDW 25.2 (H) 07/09/2019    RDW 23.5 (H) 05/31/2019    RDW 23.3 (H) 05/30/2019     09/23/2024     07/09/2019     (H) 05/31/2019     (H) 05/30/2019       CMP:    Lab Results   Component Value Date     09/23/2024    K 4.4 09/23/2024     09/23/2024    CO2 24 09/23/2024    BUN 11 09/23/2024    CREATININE 0.67 09/23/2024    GLUCOSE 98 09/23/2024    CALCIUM 9.5 09/23/2024     Lab Results   Component Value Date     09/23/2024     05/30/2019     05/21/2019    K 4.4 09/23/2024    K 4.5 05/30/2019    K 4.4 05/21/2019     09/23/2024     05/30/2019     05/21/2019    CO2 24 09/23/2024    CO2 22 05/30/2019    CO2 25 05/21/2019    BUN 11 09/23/2024    BUN 12 05/30/2019    BUN 12 05/21/2019    CREATININE 0.67 09/23/2024    CREATININE 0.77 05/30/2019    CREATININE 0.61 05/21/2019     Lab Results   Component Value Date    ALKPHOS 52 09/23/2024    ALT 8 09/23/2024    AST 9 09/23/2024    PROT 6.9 09/23/2024    BILITOT 0.5 09/23/2024       Magnesium:    No results found for: \"MG\"    Lipid Profile:    Lab Results   Component Value Date    TRIG 253 (H) 09/23/2024    HDL 46.7 09/23/2024    LDLCALC 131 (H) 09/23/2024       TSH:    Lab Results   Component Value Date    TSH 0.94 09/23/2024       BNP:   Lab Results   Component Value Date    BNP 72 " "10/09/2024        PT/INR:    No results found for: \"PROTIME\", \"INR\"    HgBA1c:    Lab Results   Component Value Date    HGBA1C 5.2 09/23/2024       Cardiac Enzymes:    No results found for: \"TROPHS\"      Diagnostic Studies:     Transthoracic Echo Limited    Result Date: 10/22/2024   [facility], [address] [place] [address]             Tel [phone] and Fax [phone] TRANSTHORACIC ECHOCARDIOGRAM REPORT  Patient Name:      KRAIG MARIAIZZY      Reading Physician:    [id number] Juan Pablo Neff MD Study Date:        10/22/2024           Ordering Provider:    [id number] SAMMY KENT MRN/PID:           [id number]             Fellow: Accession#:        [id number]         Nurse: Date of Birth/Age: 1980 / 44 years Sonographer:          Kellen Babb RDCS Gender:            F                    Additional Staff: Height:            165.10 cm            Admit Date: Weight:            120.20 kg            Admission Status:     Outpatient BSA / BMI:         2.23 m2 / 44.10      Encounter#:           [id number]                    kg/m2 Blood Pressure:    152/89 mmHg          Department Location:  [facility] Study Type:    TRANSTHORACIC ECHO (TTE) LIMITED Diagnosis/ICD: Ventricular enlargement-I51.7 Indication:    Cardiomegaly CPT Code:      Echo Limited-56309; Color Doppler-73116; Doppler Limited-47726 Patient History: Pertinent History: HTN and Hyperlipidemia. obesity. Study Detail: The following Echo studies were performed: 2D, M-Mode, Doppler and               color flow. Technically challenging study due to body habitus.  PHYSICIAN INTERPRETATION: Left Ventricle: The left ventricular systolic function is mildly decreased, with a Bah's biplane calculated ejection fraction of 41%. There is global hypokinesis of the left ventricle with minor regional variations. The left " ventricular cavity size is mildly dilated. Spectral Doppler shows a normal pattern of left ventricular diastolic filling. Left Atrium: The left atrium is normal in size. Right Ventricle: The right ventricle is normal in size. There is normal right ventricular global systolic function. Right Atrium: The right atrium is normal in size. Aortic Valve: The aortic valve is trileaflet. There is no evidence of aortic valve regurgitation. The peak instantaneous gradient of the aortic valve is 7.3 mmHg. Mitral Valve: The mitral valve is normal in structure. There is trace to mild mitral valve regurgitation. Tricuspid Valve: The tricuspid valve is structurally normal. There is trace to mild tricuspid regurgitation. The Doppler estimated RVSP is slightly elevated at 32.5 mmHg. Pulmonic Valve: The pulmonic valve was not assessed. Pulmonic valve regurgitation was not assessed. Pericardium: There is no pericardial effusion noted. Aorta: The aortic root is abnormal. There is mild dilatation of the aortic root. Systemic Veins: The inferior vena cava appears normal in size, with IVC inspiratory collapse greater than 50%. In comparison to the previous echocardiogram(s): There are no prior studies on this patient for comparison purposes.  CONCLUSIONS:  1. The left ventricular systolic function is mildly decreased, with a Bah's biplane calculated ejection fraction of 41%.  2. There is global hypokinesis of the left ventricle with minor regional variations.  3. Left ventricular cavity size is mildly dilated.  4. There is normal right ventricular global systolic function.  5. Slightly elevated right ventricular systolic pressure. QUANTITATIVE DATA SUMMARY:  2D MEASUREMENTS:          Normal Ranges: Ao Root d:       3.80 cm  (2.0-3.7cm) LAs:             4.11 cm  (2.7-4.0cm) RVIDd:           3.13 cm  (0.9-3.6cm) IVSd:            1.01 cm  (0.6-1.1cm) LVPWd:           0.73 cm  (0.6-1.1cm) LVIDd:           5.26 cm  (3.9-5.9cm) LVIDs:            4.31 cm LV Mass Index:   74 g/m2 LVEDV Index:     77 ml/m2 LV % FS          18.0 %  LA VOLUME:                    Normal Ranges: LA Vol A4C:        64.2 ml    (22+/-6mL/m2) LA Vol A2C:        68.8 ml LA Vol BP:         67.7 ml LA Vol Index A4C:  28.8ml/m2 LA Vol Index A2C:  30.9 ml/m2 LA Vol Index BP:   30.4 ml/m2 LA Area A4C:       20.0 cm2 LA Area A2C:       21.1 cm2 LA Major Axis A4C: 5.3 cm LA Major Axis A2C: 5.5 cm LA Vol A4C:        56.9 ml LA Vol A2C:        66.2 ml LA Vol Index BSA:  27.6 ml/m2  RA VOLUME BY A/L METHOD:          Normal Ranges: RA Area A4C:             15.5 cm2  M-MODE MEASUREMENTS:         Normal Ranges: AoV Exc:             2.20 cm (1.5-2.5cm)  AORTA MEASUREMENTS:         Normal Ranges: AoV Exc:            2.20 cm (1.5-2.5cm) Asc Ao, d:          3.30 cm (2.1-3.4cm)  LV SYSTOLIC FUNCTION BY 2D PLANIMETRY (MOD):                      Normal Ranges: EF-A4C View:    48 % (>=55%) EF-A2C View:    32 % EF-Biplane:     41 % LV EF Reported: 41 %  LV DIASTOLIC FUNCTION:             Normal Ranges: MV Peak E:             0.77 m/s    (0.7-1.2 m/s) MV Peak A:             0.90 m/s    (0.42-0.7 m/s) E/A Ratio:             0.85        (1.0-2.2) MV e'                  0.065 m/s   (>8.0) MV lateral e'          0.07 m/s MV medial e'           0.06 m/s MV A Dur:              103.81 msec E/e' Ratio:            11.82       (<8.0) a'                     0.12 m/s MV DT:                 81 msec     (150-240 msec)  MITRAL VALVE:         Normal Ranges: MV DT:        81 msec (150-240msec)  AORTIC VALVE:           Normal Ranges: AoV Vmax:      1.35 m/s (<=1.7m/s) AoV Peak P.3 mmHg (<20mmHg) LVOT Max Cristobal:  1.16 m/s (<=1.1m/s) LVOT VTI:      22.12 cm LVOT Diameter: 2.38 cm  (1.8-2.4cm) AoV Area,Vmax: 3.83 cm2 (2.5-4.5cm2)  RIGHT VENTRICLE: RV Basal 4.00 cm RV Mid   3.10 cm RV Major 6.5 cm TAPSE:   23.0 mm RV s'    0.12 m/s  TRICUSPID VALVE/RVSP:          Normal Ranges: Peak TR Velocity:     2.72 m/s RV Syst  Pressure:     33 mmHg  (< 30mmHg) IVC Diam:             1.90 cm  PULMONIC VALVE:          Normal Ranges: PV Max Cristobal:     1.1 m/s  (0.6-0.9m/s) PV Max P.1 mmHg  AORTA: Asc Ao Diam 3.35 cm  [id number] Juan Pablo Neff MD Electronically signed on 10/22/2024 at 9:48:34 AM  ** Final **      EKG today - NSR with sinus arrhythmia, low voltage QRS, PRWP. Compared with EKG 2018 HR has decreased, now with low voltage QRS.     Radiology:     No valid procedures specified.    Assessment:     Problem List Items Addressed This Visit       Primary hypertension    Relevant Orders    ECG 12 lead (Clinic Performed) (Completed)    Mixed hyperlipidemia    Cardiomegaly - Primary    Relevant Medications    metoprolol succinate XL (Toprol-XL) 25 mg 24 hr tablet    Other Relevant Orders    MR cardiac morphology and function w and wo IV contrast    Cardiomyopathy    Relevant Medications    metoprolol succinate XL (Toprol-XL) 25 mg 24 hr tablet    Other Relevant Orders    MR cardiac morphology and function w and wo IV contrast    Abnormal EKG    Relevant Orders    MR cardiac morphology and function w and wo IV contrast    Dyslipidemia       Plan:   1.  Cardiomyopathy and cardiomegaly.  This was seen by chest x-ray and echocardiogram 2 months ago.  The patient has since been started on lisinopril with decreasing heart rate and little bit more overall sense of wellbeing.  Possible etiologies for hypertensive induced, tachycardia induced or viral cardiomyopathy being most likely.  An inherited cardiomyopathy cannot be ruled out but the patient is unaware of any family history of heart failure.  Her mother is  from cancer in her 60s but never had heart failure prior to her death, her maternal grandfather  at 29 presumably of a heart attack and her father does not have congestive heart failure.  She has no siblings cousins aunts or uncles that she is aware of that have heart failure.    Because of the technically difficult imaging  we have suggested a cardiac MRI to rule out infiltrative disorders look at LV mass as well as structure and function.  While I do not suspect ischemic heart disease certainly that would be seen on a cardiac MRI as well as any other infiltrative disorders.  Given her young age making sure that these are not present would be important to her overall current and future health.    In addition to her ACE inhibitor we will add low-dose beta-blocker to her medical regimen.  As I cannot clearly say that she had clinical heart failure for now we will hold on the mineralocorticoids and SGLT2 inhibitors.    2.  Hypertension.  Her blood pressure is not superhigh but she is likely had it for some time so hypertensive cardiomyopathy is a possibility.  Is been easily controlled with low-dose ACE inhibitor therapy so I do not suspect that being the case.  3.  Abnormal EKG.  Unchanged from an EKG 6 years ago other than right.    Will see the patient in follow-up after cardiac MRI.  If we are unable to obtain this as she has claustrophobia we will consider other things such as a nuclear stress test for EF wall motion and function.    Will see her in follow-up after testing.

## 2024-12-30 ENCOUNTER — APPOINTMENT (OUTPATIENT)
Dept: CARDIOLOGY | Facility: CLINIC | Age: 44
End: 2024-12-30
Payer: COMMERCIAL

## 2025-01-29 ENCOUNTER — TELEPHONE (OUTPATIENT)
Dept: CARDIOLOGY | Facility: CLINIC | Age: 45
End: 2025-01-29
Payer: COMMERCIAL

## 2025-01-29 ENCOUNTER — HOSPITAL ENCOUNTER (OUTPATIENT)
Dept: RADIOLOGY | Facility: HOSPITAL | Age: 45
Discharge: HOME | End: 2025-01-29
Payer: COMMERCIAL

## 2025-01-29 VITALS — HEIGHT: 65 IN | BODY MASS INDEX: 41.87 KG/M2 | WEIGHT: 251.32 LBS

## 2025-01-29 DIAGNOSIS — I51.7 CARDIOMEGALY: ICD-10-CM

## 2025-01-29 DIAGNOSIS — R94.31 ABNORMAL EKG: ICD-10-CM

## 2025-01-29 DIAGNOSIS — I42.9 CARDIOMYOPATHY, UNSPECIFIED TYPE (MULTI): ICD-10-CM

## 2025-01-29 PROCEDURE — A9575 INJ GADOTERATE MEGLUMI 0.1ML: HCPCS | Performed by: INTERNAL MEDICINE

## 2025-01-29 PROCEDURE — 75561 CARDIAC MRI FOR MORPH W/DYE: CPT | Performed by: INTERNAL MEDICINE

## 2025-01-29 PROCEDURE — 2550000001 HC RX 255 CONTRASTS: Performed by: INTERNAL MEDICINE

## 2025-01-29 PROCEDURE — 75561 CARDIAC MRI FOR MORPH W/DYE: CPT

## 2025-01-29 RX ORDER — GADOTERATE MEGLUMINE 376.9 MG/ML
40 INJECTION INTRAVENOUS
Status: COMPLETED | OUTPATIENT
Start: 2025-01-29 | End: 2025-01-29

## 2025-01-29 RX ADMIN — GADOTERATE MEGLUMINE 40 ML: 376.9 INJECTION INTRAVENOUS at 08:58

## 2025-01-29 NOTE — TELEPHONE ENCOUNTER
----- Message from Jyoti Machado sent at 1/29/2025  3:36 PM EST -----  cMRI shows mild heart dysfunction and some fibrosis of her heart that is NOT from prior heart attack. Office follow-up.  ----- Message -----  From: Interface, Radiology Results In  Sent: 1/29/2025  11:28 AM EST  To: Jyoti Machado MD

## 2025-02-07 ENCOUNTER — APPOINTMENT (OUTPATIENT)
Dept: CARDIOLOGY | Facility: CLINIC | Age: 45
End: 2025-02-07
Payer: COMMERCIAL

## 2025-02-07 VITALS
WEIGHT: 248 LBS | SYSTOLIC BLOOD PRESSURE: 124 MMHG | OXYGEN SATURATION: 99 % | BODY MASS INDEX: 40.82 KG/M2 | DIASTOLIC BLOOD PRESSURE: 84 MMHG | HEART RATE: 89 BPM

## 2025-02-07 DIAGNOSIS — E78.2 MIXED HYPERLIPIDEMIA: ICD-10-CM

## 2025-02-07 DIAGNOSIS — I51.4 MYOCARDIAL FIBROSIS (MULTI): Primary | ICD-10-CM

## 2025-02-07 DIAGNOSIS — I42.9 CARDIOMYOPATHY, UNSPECIFIED TYPE (MULTI): ICD-10-CM

## 2025-02-07 DIAGNOSIS — I10 PRIMARY HYPERTENSION: ICD-10-CM

## 2025-02-07 PROCEDURE — 1036F TOBACCO NON-USER: CPT | Performed by: INTERNAL MEDICINE

## 2025-02-07 PROCEDURE — 3079F DIAST BP 80-89 MM HG: CPT | Performed by: INTERNAL MEDICINE

## 2025-02-07 PROCEDURE — 99213 OFFICE O/P EST LOW 20 MIN: CPT | Performed by: INTERNAL MEDICINE

## 2025-02-07 PROCEDURE — 3074F SYST BP LT 130 MM HG: CPT | Performed by: INTERNAL MEDICINE

## 2025-02-07 ASSESSMENT — ENCOUNTER SYMPTOMS
SHORTNESS OF BREATH: 1
GASTROINTESTINAL NEGATIVE: 1
STRIDOR: 0
NEUROLOGICAL NEGATIVE: 1
COUGH: 0
CARDIOVASCULAR NEGATIVE: 1
CONSTITUTIONAL NEGATIVE: 1
WHEEZING: 0
PSYCHIATRIC NEGATIVE: 1
MUSCULOSKELETAL NEGATIVE: 1

## 2025-02-07 NOTE — PROGRESS NOTES
Patient:  Mabel Benton  YOB: 1980  MRN: [id number]       Chief Complaint/Active Symptoms:       Mabel Benton is a 44 y.o. female who returns today for cardiac follow-up.    Patient is doing well.  She is noticing improvement in her symptoms with starting the lisinopril and metoprolol succinate.  She does not feel her heart pounding.  Shortness of breath is improved.  She has some shortness of breath with exertion but much less than what she experienced previously.  No syncope or near syncope.  No side effects from her current medications.    We reviewed the results of her cardiac MRI and discuss possibilities.  We discussed seeing the advanced heart failure specialist to see if any additional testing would be beneficial to try to ascertain a cause in case the patient would be at risk for further fibrosis and deterioration.    Review of Systems   Constitutional: Negative.    Respiratory:  Positive for shortness of breath. Negative for cough, wheezing and stridor.    Cardiovascular: Negative.    Gastrointestinal: Negative.    Genitourinary: Negative.    Musculoskeletal: Negative.    Neurological: Negative.    Psychiatric/Behavioral: Negative.     All other systems reviewed and are negative.      Objective:     Vitals:    02/07/25 0914   BP: 124/84   Pulse: 89   SpO2: 99%       Vitals:    02/07/25 0914   Weight: 112 kg (248 lb)       Allergies:     No Known Allergies       Medications:     Current Outpatient Medications   Medication Instructions    inulin (Fiber Gummies) 2 gram tablet,chewable Chew.    lisinopril 10 mg, oral, Daily    metoprolol succinate XL (TOPROL-XL) 25 mg, oral, Daily, Do not crush or chew.    multivitamin tablet 1 tablet, Daily       Physical Examination:   GENERAL:  Well developed, well nourished, in no acute distress.  HEENT: NC AT, EOMI with anicteric sclera  CHEST:  Symmetric and nontender.  LUNGS:  Clear to auscultation bilaterally, normal respiratory effort.  HEART:  PMI  "is not palpable and heart sounds are distant. RRR with normal S1 and S2, no S3, no mumur or rub. No carotid or abdominal bruits  EXTREMITIES:  Warm with good color, no clubbing or cyanosis.  There is no edema noted.  PERIPHERAL VASCULAR:  Pulses present and equally palpable; 2+ throughout.  MUSCULOSKELETAL: No significant joint or limb deformity  NEURO/PSYCH:  Alert and oriented times three with approppriate behavior and responses. Nonfocal motor examination with normal gait and ambulation  Skin: no rash or lesions on exposed skin or reported.    Lab:     CBC:   Lab Results   Component Value Date    WBC 7.6 09/23/2024    RBC 4.61 09/23/2024    HGB 12.8 09/23/2024    HCT 39.9 09/23/2024     09/23/2024        CMP:    Lab Results   Component Value Date     09/23/2024    K 4.4 09/23/2024     09/23/2024    CO2 24 09/23/2024    BUN 11 09/23/2024    CREATININE 0.67 09/23/2024    GLUCOSE 98 09/23/2024    CALCIUM 9.5 09/23/2024       Magnesium:    No results found for: \"MG\"    Lipid Profile:    Lab Results   Component Value Date    TRIG 253 (H) 09/23/2024    HDL 46.7 09/23/2024    LDLCALC 131 (H) 09/23/2024       TSH:    Lab Results   Component Value Date    TSH 0.94 09/23/2024       BNP:   Lab Results   Component Value Date    BNP 72 10/09/2024        PT/INR:    No results found for: \"PROTIME\", \"INR\"    HgBA1c:    Lab Results   Component Value Date    HGBA1C 5.2 09/23/2024       BMP:  Lab Results   Component Value Date     09/23/2024     05/30/2019     05/21/2019    K 4.4 09/23/2024    K 4.5 05/30/2019    K 4.4 05/21/2019     09/23/2024     05/30/2019     05/21/2019    CO2 24 09/23/2024    CO2 22 05/30/2019    CO2 25 05/21/2019    BUN 11 09/23/2024    BUN 12 05/30/2019    BUN 12 05/21/2019    CREATININE 0.67 09/23/2024    CREATININE 0.77 05/30/2019    CREATININE 0.61 05/21/2019       Cardiac Enzymes:    No results found for: \"TROPHS\"    Hepatic Function Panel:    Lab " Results   Component Value Date    ALKPHOS 52 09/23/2024    ALT 8 09/23/2024    AST 9 09/23/2024    PROT 6.9 09/23/2024    BILITOT 0.5 09/23/2024         Diagnostic Studies:     Transthoracic Echo Limited    Result Date: 10/22/2024   [facility], [address] [place] [address]             Tel [phone] and Fax [phone] TRANSTHORACIC ECHOCARDIOGRAM REPORT  Patient Name:      KRAIG VIRAL      Reading Physician:    [id number] Juan Pablo Neff MD Study Date:        10/22/2024           Ordering Provider:    [id number] SAMMY KENT MRN/PID:           [id number]             Fellow: Accession#:        [id number]         Nurse: Date of Birth/Age: 1980 / 44 years Sonographer:          Kellen Babb RDCS Gender:            F                    Additional Staff: Height:            165.10 cm            Admit Date: Weight:            120.20 kg            Admission Status:     Outpatient BSA / BMI:         2.23 m2 / 44.10      Encounter#:           [id number]                    kg/m2 Blood Pressure:    152/89 mmHg          Department Location:  [facility] Study Type:    TRANSTHORACIC ECHO (TTE) LIMITED Diagnosis/ICD: Ventricular enlargement-I51.7 Indication:    Cardiomegaly CPT Code:      Echo Limited-49819; Color Doppler-21425; Doppler Limited-25180 Patient History: Pertinent History: HTN and Hyperlipidemia. obesity. Study Detail: The following Echo studies were performed: 2D, M-Mode, Doppler and               color flow. Technically challenging study due to body habitus.  PHYSICIAN INTERPRETATION: Left Ventricle: The left ventricular systolic function is mildly decreased, with a Bah's biplane calculated ejection fraction of 41%. There is global hypokinesis of the left ventricle with minor regional variations. The left ventricular cavity size is mildly dilated. Spectral Doppler  shows a normal pattern of left ventricular diastolic filling. Left Atrium: The left atrium is normal in size. Right Ventricle: The right ventricle is normal in size. There is normal right ventricular global systolic function. Right Atrium: The right atrium is normal in size. Aortic Valve: The aortic valve is trileaflet. There is no evidence of aortic valve regurgitation. The peak instantaneous gradient of the aortic valve is 7.3 mmHg. Mitral Valve: The mitral valve is normal in structure. There is trace to mild mitral valve regurgitation. Tricuspid Valve: The tricuspid valve is structurally normal. There is trace to mild tricuspid regurgitation. The Doppler estimated RVSP is slightly elevated at 32.5 mmHg. Pulmonic Valve: The pulmonic valve was not assessed. Pulmonic valve regurgitation was not assessed. Pericardium: There is no pericardial effusion noted. Aorta: The aortic root is abnormal. There is mild dilatation of the aortic root. Systemic Veins: The inferior vena cava appears normal in size, with IVC inspiratory collapse greater than 50%. In comparison to the previous echocardiogram(s): There are no prior studies on this patient for comparison purposes.  CONCLUSIONS:  1. The left ventricular systolic function is mildly decreased, with a Bah's biplane calculated ejection fraction of 41%.  2. There is global hypokinesis of the left ventricle with minor regional variations.  3. Left ventricular cavity size is mildly dilated.  4. There is normal right ventricular global systolic function.  5. Slightly elevated right ventricular systolic pressure. QUANTITATIVE DATA SUMMARY:  2D MEASUREMENTS:          Normal Ranges: Ao Root d:       3.80 cm  (2.0-3.7cm) LAs:             4.11 cm  (2.7-4.0cm) RVIDd:           3.13 cm  (0.9-3.6cm) IVSd:            1.01 cm  (0.6-1.1cm) LVPWd:           0.73 cm  (0.6-1.1cm) LVIDd:           5.26 cm  (3.9-5.9cm) LVIDs:           4.31 cm LV Mass Index:   74 g/m2 LVEDV Index:     77  ml/m2 LV % FS          18.0 %  LA VOLUME:                    Normal Ranges: LA Vol A4C:        64.2 ml    (22+/-6mL/m2) LA Vol A2C:        68.8 ml LA Vol BP:         67.7 ml LA Vol Index A4C:  28.8ml/m2 LA Vol Index A2C:  30.9 ml/m2 LA Vol Index BP:   30.4 ml/m2 LA Area A4C:       20.0 cm2 LA Area A2C:       21.1 cm2 LA Major Axis A4C: 5.3 cm LA Major Axis A2C: 5.5 cm LA Vol A4C:        56.9 ml LA Vol A2C:        66.2 ml LA Vol Index BSA:  27.6 ml/m2  RA VOLUME BY A/L METHOD:          Normal Ranges: RA Area A4C:             15.5 cm2  M-MODE MEASUREMENTS:         Normal Ranges: AoV Exc:             2.20 cm (1.5-2.5cm)  AORTA MEASUREMENTS:         Normal Ranges: AoV Exc:            2.20 cm (1.5-2.5cm) Asc Ao, d:          3.30 cm (2.1-3.4cm)  LV SYSTOLIC FUNCTION BY 2D PLANIMETRY (MOD):                      Normal Ranges: EF-A4C View:    48 % (>=55%) EF-A2C View:    32 % EF-Biplane:     41 % LV EF Reported: 41 %  LV DIASTOLIC FUNCTION:             Normal Ranges: MV Peak E:             0.77 m/s    (0.7-1.2 m/s) MV Peak A:             0.90 m/s    (0.42-0.7 m/s) E/A Ratio:             0.85        (1.0-2.2) MV e'                  0.065 m/s   (>8.0) MV lateral e'          0.07 m/s MV medial e'           0.06 m/s MV A Dur:              103.81 msec E/e' Ratio:            11.82       (<8.0) a'                     0.12 m/s MV DT:                 81 msec     (150-240 msec)  MITRAL VALVE:         Normal Ranges: MV DT:        81 msec (150-240msec)  AORTIC VALVE:           Normal Ranges: AoV Vmax:      1.35 m/s (<=1.7m/s) AoV Peak P.3 mmHg (<20mmHg) LVOT Max Cristobal:  1.16 m/s (<=1.1m/s) LVOT VTI:      22.12 cm LVOT Diameter: 2.38 cm  (1.8-2.4cm) AoV Area,Vmax: 3.83 cm2 (2.5-4.5cm2)  RIGHT VENTRICLE: RV Basal 4.00 cm RV Mid   3.10 cm RV Major 6.5 cm TAPSE:   23.0 mm RV s'    0.12 m/s  TRICUSPID VALVE/RVSP:          Normal Ranges: Peak TR Velocity:     2.72 m/s RV Syst Pressure:     33 mmHg  (< 30mmHg) IVC Diam:             1.90  "cm  PULMONIC VALVE:          Normal Ranges: PV Max Cristobal:     1.1 m/s  (0.6-0.9m/s) PV Max P.1 mmHg  AORTA: Asc Ao Diam 3.35 cm  [id number] Juan Pablo Neff MD Electronically signed on 10/22/2024 at 9:48:34 AM  ** Final **      No nuclear medicine results found for the past 12 months    No valid procedures specified.    EKG:   No results found for: \"EKG\"    Radiology:     Cardiac MRI results were reviewed with the patient.    ASSESSMENT     Problem List Items Addressed This Visit       Primary hypertension    Mixed hyperlipidemia    Cardiomyopathy    Relevant Orders    Referral to Advanced Heart Failure Program    Myocardial fibrosis (Multi) - Primary    Relevant Orders    CK    Troponin I, High Sensitivity    Referral to Advanced Heart Failure Program       PLAN     1.  Cardiomyopathy and myocardial fibrosis.  The etiology of her myocardial fibrosis is unknown.  In hindsight possibly she had a viral infection with viral myocarditis that now healed.  I doubt from the description and the MRI findings that this was ischemic in nature.  Rather than doing gunshot additional testing I have discussed with the patient seeing the advanced heart failure specialist.  If this was just the matter of having a viral illness that left her with some scarring of her heart and some mild heart dysfunction that is fine.  But if there are other potential causes that we need to exclude so that she does not have any future deterioration of her heart function is important.  As she is doing well on the lisinopril and low-dose beta-blocker therapy will continue the same.  We did discuss briefly SGLT2 inhibitors but for now with her normal BN peptide and improved symptoms we will hold off.  2.  Primary hypertension.  Has improved on the ACE inhibitor and beta-blocker continue the same.  3.  Mixed hyperlipidemia.  At this point in time would continue diet, exercise and weight loss.    Will see her back in follow-up in 6 months.  If the advanced " heart failure team feels additional testing is necessary an earlier follow-up she knows to contact me regarding that.

## 2025-02-09 LAB
CK SERPL-CCNC: 51 U/L (ref 20–239)
TROPONIN I SERPL-MCNC: NORMAL NG/ML

## 2025-02-10 LAB
CK SERPL-CCNC: 51 U/L (ref 20–239)
TROPONIN I SERPL-MCNC: <3 NG/L

## 2025-03-16 DIAGNOSIS — I10 PRIMARY HYPERTENSION: ICD-10-CM

## 2025-03-16 DIAGNOSIS — I42.9 CARDIOMYOPATHY, UNSPECIFIED TYPE (MULTI): ICD-10-CM

## 2025-03-17 RX ORDER — LISINOPRIL 10 MG/1
10 TABLET ORAL DAILY
Qty: 90 TABLET | Refills: 1 | Status: SHIPPED | OUTPATIENT
Start: 2025-03-17 | End: 2025-03-20 | Stop reason: ALTCHOICE

## 2025-03-20 ENCOUNTER — DOCUMENTATION (OUTPATIENT)
Dept: CARDIOLOGY | Facility: HOSPITAL | Age: 45
End: 2025-03-20

## 2025-03-20 ENCOUNTER — OFFICE VISIT (OUTPATIENT)
Dept: CARDIOLOGY | Facility: HOSPITAL | Age: 45
End: 2025-03-20
Payer: COMMERCIAL

## 2025-03-20 VITALS
OXYGEN SATURATION: 97 % | SYSTOLIC BLOOD PRESSURE: 117 MMHG | HEART RATE: 87 BPM | BODY MASS INDEX: 40.35 KG/M2 | WEIGHT: 242.2 LBS | HEIGHT: 65 IN | DIASTOLIC BLOOD PRESSURE: 78 MMHG

## 2025-03-20 DIAGNOSIS — I50.20 ACC/AHA STAGE B SYSTOLIC HEART FAILURE: Primary | ICD-10-CM

## 2025-03-20 DIAGNOSIS — I51.4 MYOCARDIAL FIBROSIS (MULTI): ICD-10-CM

## 2025-03-20 DIAGNOSIS — I42.9 CARDIOMYOPATHY, UNSPECIFIED TYPE (MULTI): ICD-10-CM

## 2025-03-20 PROCEDURE — 3008F BODY MASS INDEX DOCD: CPT | Performed by: INTERNAL MEDICINE

## 2025-03-20 PROCEDURE — 99214 OFFICE O/P EST MOD 30 MIN: CPT | Performed by: INTERNAL MEDICINE

## 2025-03-20 PROCEDURE — 1036F TOBACCO NON-USER: CPT | Performed by: INTERNAL MEDICINE

## 2025-03-20 PROCEDURE — 3074F SYST BP LT 130 MM HG: CPT | Performed by: INTERNAL MEDICINE

## 2025-03-20 PROCEDURE — 3078F DIAST BP <80 MM HG: CPT | Performed by: INTERNAL MEDICINE

## 2025-03-20 PROCEDURE — 99204 OFFICE O/P NEW MOD 45 MIN: CPT | Performed by: INTERNAL MEDICINE

## 2025-03-20 RX ORDER — METOPROLOL TARTRATE 100 MG/1
100 TABLET ORAL ONCE
Qty: 1 TABLET | Refills: 0 | Status: SHIPPED | OUTPATIENT
Start: 2025-03-20 | End: 2025-03-20

## 2025-03-20 ASSESSMENT — COLUMBIA-SUICIDE SEVERITY RATING SCALE - C-SSRS
2. HAVE YOU ACTUALLY HAD ANY THOUGHTS OF KILLING YOURSELF?: NO
1. IN THE PAST MONTH, HAVE YOU WISHED YOU WERE DEAD OR WISHED YOU COULD GO TO SLEEP AND NOT WAKE UP?: NO
6. HAVE YOU EVER DONE ANYTHING, STARTED TO DO ANYTHING, OR PREPARED TO DO ANYTHING TO END YOUR LIFE?: NO

## 2025-03-20 ASSESSMENT — PAIN SCALES - GENERAL: PAINLEVEL_OUTOF10: 0-NO PAIN

## 2025-03-20 ASSESSMENT — ENCOUNTER SYMPTOMS
DEPRESSION: 0
LOSS OF SENSATION IN FEET: 0
OCCASIONAL FEELINGS OF UNSTEADINESS: 0

## 2025-03-20 ASSESSMENT — PATIENT HEALTH QUESTIONNAIRE - PHQ9
SUM OF ALL RESPONSES TO PHQ9 QUESTIONS 1 AND 2: 0
1. LITTLE INTEREST OR PLEASURE IN DOING THINGS: NOT AT ALL
2. FEELING DOWN, DEPRESSED OR HOPELESS: NOT AT ALL

## 2025-03-20 NOTE — PATIENT INSTRUCTIONS
It was a pleasure seeing you today. Please contact myself or my team with any questions.     To reach Dr. Mcintosh' office please call [phone] ([place]).   Fax: [phone]   To schedule an appointment call [phone]     If you have any questions or need cardiac medication refills, please call the Heart Failure office at [phone], option 6. You may also contact the  Heart Failure Nursing team via email at [email or web address] (Please include your name and date of birth).        1) Stop lisinopril  2) Start entresto 24/26 mg twice a day (start 2 days after stopping lisinopril)  3) We will do a cardiac CTA; to schedule, call [phone]. On the day of the scan you will take a one time dose of metoprolol tartrate 100 mg    4) Labs in 7-10 days (RFP/BNP)  5) Monitor your blood pressure and heart rate and call/email in 2 weeks  6) Follow up in 6 months at /[facility]

## 2025-03-20 NOTE — PROGRESS NOTES
"    [facility]  Primary Care Physician: Portia Astudillo MD  Referring Provider/Cardiologist: Jeannette (/[facility])     Date of Visit: 03/20/2025  1:00 PM EDT  Location of visit: [facility]     HPI:   Ms. 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     She reports being quite ill last August with a bad cold. A month later she noted a cough and underwent a work up which included a CXR noting cardiomegaly. This lead to an echo noting LV dysfunction. She was referred to Cardiology and a cMRI was performed noting scar.     At the current time she denies chest pain, pressure, SOB/LONGORIA, PND, orthopnea, LE edema, palpitations, light headedness, dizziness, or syncope. She previously had palpitations, which have resolved since she started GDMT.       Hospitalizations: ED 2/15/25 Allergic reaction       PMHx:  stage B systolic HF/HFmrEF currently without a device    SocHx:  Lives in [place] alone  Denies tobacco/rare etOH/denies illicits    FamHx:  Paternal grandfather had cardiac disease      Current Outpatient Medications   Medication Sig Dispense Refill    inulin (Fiber Gummies) 2 gram tablet,chewable Chew.      lisinopril 10 mg tablet Take 1 tablet (10 mg) by mouth once daily. 90 tablet 1    metoprolol succinate XL (Toprol-XL) 25 mg 24 hr tablet Take 1 tablet (25 mg) by mouth once daily. Do not crush or chew. 90 tablet 3    multivitamin tablet Take 1 tablet by mouth once daily.       No current facility-administered medications for this visit.       No Known Allergies      Visit Vitals  /78 (BP Location: Left arm, Patient Position: Sitting, BP Cuff Size: Large adult)   Pulse 87   Ht 1.651 m (5' 5\")   Wt 110 kg (242 lb 3.2 oz)   SpO2 97%   BMI 40.30 kg/m²   Smoking Status Never   BSA 2.25 m²        Physical Exam:  On exam Ms. " Chetan appears her stated age, is alert and oriented x3, and in no acute distress. Her sclera are anicteric and her oropharynx has moist mucous membranes. Her neck is supple and without thyromegaly. The JVP is ~5 cm of water above the right atrium. Her cardiac exam has regular rhythm, normal S1, S2. No S3/4. There are no murmurs. Her lungs are clear to auscultation bilaterally and there is no dullness to percussion. Her abdomen is soft, nontender with normoactive bowel sounds. There is no HJR. The extremities are warm and without edema. The skin is dry. There is no rash present. The distal pulses are 2+ in all four extremities. Her mood and affect are appropriate for todays encounter.       Cardiac Labs/Diagnostics:    Lab Results   Component Value Date    CREATININE 0.67 09/23/2024    BUN 11 09/23/2024     09/23/2024    K 4.4 09/23/2024     09/23/2024    CO2 24 09/23/2024        Recent Labs     09/23/24  1102   CHOL 228*   LDLCALC 131*   HDL 46.7   TRIG 253*       Recent Labs     10/09/24  1705   BNP 72       cMRI (1/29/25):  1. Nonischemic cardiomyopathy. Mid myocardial scar less than 33% myocardial thickness is present in the base to mid anterior, septal, inferior and lateral walls which is most prominent in the basal septum. Elevated ECV 33% likely consistent with expanded extracellular volume. Focal circumscribed transmural scar noted in the mid to distal anterior septum, likely of nonischemic origin.  2. No evidence of infiltrative disease.  3. Normal LV size (EDVi 89 ml/m2) and mildly reduced systolic function. Quantitative LVEF 44 %. Globally hypokinetic.  4. Normal RV size (EDVi 74 ml/m2) and normal systolic function. Quantitative RVEF 49 %.  5. Mild AR (RF 3%).    Echo (10/22/24):  1. The left ventricular systolic function is mildly decreased, with a Bah's biplane calculated ejection fraction of 41%.  2. There is global hypokinesis of the left ventricle with minor regional variations.  3.  Left ventricular cavity size is mildly dilated.  4. There is normal right ventricular global systolic function.  5. Slightly elevated right ventricular systolic pressure.      Impression/Plan:  Ms. 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     1) Stage B systolic HF/HFmrEF with mild LV dysfunction (LVEF 44% 1/2025) currently without a device  Referred for consultation for newly diagnosed cardiomyopathy. History and imaging reviewed, most likely the result of viral myocarditis. Currently without symptoms. Will transition ACE to ARNi and monitor.   -discontinue lisinopril  -start entresto 24/26 mg bid  -c/w metoprolol succinate 25 mg daily  -repeat labs (RFP/BNP) in 7-10 days  -monitor vitals and call/email in 2 weeks  -will pursue CCTA to ensure no coronary issues  -will need a repeat echo once she is OMT       F/U: 6 months at /[facility]    Jyoti,  Thank you for referring Ms. Benton to the  Advanced Heart Failure Clinic. Please let me know if you have any questions.     ____________________________________________________________  Adria Mcintosh,   Section of Advanced Heart Failure and Cardiac Transplantation  Division of Cardiovascular Medicine  [facility]  [facility]

## 2025-03-20 NOTE — LETTER
March 20, 2025     Jyoti Machado MD  [id number]  [facility] [address]    Patient: Mabel Benton   YOB: 1980   Date of Visit: 3/20/2025       Dear Dr. Jyoti Machado MD:    Thank you for referring Mabel Benton to me for evaluation. Below are my notes for this consultation.  If you have questions, please do not hesitate to call me. I look forward to following your patient along with you.       Sincerely,     Adria Mcintosh,       CC: No Recipients  ______________________________________________________________________________________        [facility]  Primary Care Physician: Portia Astudillo MD  Referring Provider/Cardiologist: Jeannette (/[facility])     Date of Visit: 03/20/2025  1:00 PM EDT  Location of visit: [facility]     HPI:   Ms. 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     She reports being quite ill last August with a bad cold. A month later she noted a cough and underwent a work up which included a CXR noting cardiomegaly. This lead to an echo noting LV dysfunction. She was referred to Cardiology and a cMRI was performed noting scar.     At the current time she denies chest pain, pressure, SOB/LONGORIA, PND, orthopnea, LE edema, palpitations, light headedness, dizziness, or syncope. She previously had palpitations, which have resolved since she started GDMT.       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     • lisinopril 10 mg tablet Take 1 tablet (10  "mg) by mouth once daily. 90 tablet 1   • metoprolol succinate XL (Toprol-XL) 25 mg 24 hr tablet Take 1 tablet (25 mg) by mouth once daily. Do not crush or chew. 90 tablet 3   • multivitamin tablet Take 1 tablet by mouth once daily.       No current facility-administered medications for this visit.       No Known Allergies      Visit Vitals  /78 (BP Location: Left arm, Patient Position: Sitting, BP Cuff Size: Large adult)   Pulse 87   Ht 1.651 m (5' 5\")   Wt 110 kg (242 lb 3.2 oz)   SpO2 97%   BMI 40.30 kg/m²   Smoking Status Never   BSA 2.25 m²        Physical Exam:  On exam Ms. Benton appears her stated age, is alert and oriented x3, and in no acute distress. Her sclera are anicteric and her oropharynx has moist mucous membranes. Her neck is supple and without thyromegaly. The JVP is ~5 cm of water above the right atrium. Her cardiac exam has regular rhythm, normal S1, S2. No S3/4. There are no murmurs. Her lungs are clear to auscultation bilaterally and there is no dullness to percussion. Her abdomen is soft, nontender with normoactive bowel sounds. There is no HJR. The extremities are warm and without edema. The skin is dry. There is no rash present. The distal pulses are 2+ in all four extremities. Her mood and affect are appropriate for todays encounter.       Cardiac Labs/Diagnostics:    Lab Results   Component Value Date    CREATININE 0.67 09/23/2024    BUN 11 09/23/2024     09/23/2024    K 4.4 09/23/2024     09/23/2024    CO2 24 09/23/2024        Recent Labs     09/23/24  1102   CHOL 228*   LDLCALC 131*   HDL 46.7   TRIG 253*       Recent Labs     10/09/24  1705   BNP 72       cMRI (1/29/25):  1. Nonischemic cardiomyopathy. Mid myocardial scar less than 33% myocardial thickness is present in the base to mid anterior, septal, inferior and lateral walls which is most prominent in the basal septum. Elevated ECV 33% likely consistent with expanded extracellular volume. Focal circumscribed " transmural scar noted in the mid to distal anterior septum, likely of nonischemic origin.  2. No evidence of infiltrative disease.  3. Normal LV size (EDVi 89 ml/m2) and mildly reduced systolic function. Quantitative LVEF 44 %. Globally hypokinetic.  4. Normal RV size (EDVi 74 ml/m2) and normal systolic function. Quantitative RVEF 49 %.  5. Mild AR (RF 3%).    Echo (10/22/24):  1. The left ventricular systolic function is mildly decreased, with a Bah's biplane calculated ejection fraction of 41%.  2. There is global hypokinesis of the left ventricle with minor regional variations.  3. Left ventricular cavity size is mildly dilated.  4. There is normal right ventricular global systolic function.  5. Slightly elevated right ventricular systolic pressure.      Impression/Plan:  Ms. 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     1) Stage B systolic HF/HFmrEF with mild LV dysfunction (LVEF 44% 1/2025) currently without a device  Referred for consultation for newly diagnosed cardiomyopathy. History and imaging reviewed, most likely the result of viral myocarditis. Currently without symptoms. Will transition ACE to ARNi and monitor.   -discontinue lisinopril  -start entresto 24/26 mg bid  -c/w metoprolol succinate 25 mg daily  -repeat labs (RFP/BNP) in 7-10 days  -monitor vitals and call/email in 2 weeks  -will pursue CCTA to ensure no coronary issues  -will need a repeat echo once she is OMT       F/U: 6 months at /[facility]    Jyoti,  Thank you for referring Ms. Benton to the  Advanced Heart Failure Clinic. Please let me know if you have any questions.     ____________________________________________________________  Adria Mcintosh, DO  Section of Advanced Heart Failure and Cardiac Transplantation  Division of Cardiovascular Medicine  [facility]  [facility]

## 2025-03-21 DIAGNOSIS — I50.20 ACC/AHA STAGE B SYSTOLIC HEART FAILURE: ICD-10-CM

## 2025-03-21 DIAGNOSIS — I42.9 CARDIOMYOPATHY, UNSPECIFIED TYPE (MULTI): ICD-10-CM

## 2025-03-21 RX ORDER — METOPROLOL SUCCINATE 25 MG/1
25 TABLET, EXTENDED RELEASE ORAL DAILY
Qty: 90 TABLET | Refills: 3 | Status: SHIPPED | OUTPATIENT
Start: 2025-03-21 | End: 2025-03-21 | Stop reason: SDUPTHER

## 2025-03-21 RX ORDER — METOPROLOL SUCCINATE 25 MG/1
25 TABLET, EXTENDED RELEASE ORAL DAILY
Qty: 90 TABLET | Refills: 3 | Status: SHIPPED | OUTPATIENT
Start: 2025-03-21

## 2025-03-30 LAB
ALBUMIN SERPL-MCNC: 4.3 G/DL (ref 3.6–5.1)
BNP SERPL-MCNC: NORMAL PG/ML
BUN SERPL-MCNC: 14 MG/DL (ref 7–25)
BUN/CREAT SERPL: NORMAL (CALC) (ref 6–22)
CALCIUM SERPL-MCNC: 9.1 MG/DL (ref 8.6–10.2)
CHLORIDE SERPL-SCNC: 104 MMOL/L (ref 98–110)
CO2 SERPL-SCNC: 28 MMOL/L (ref 20–32)
CREAT SERPL-MCNC: 0.6 MG/DL (ref 0.5–0.99)
EGFRCR SERPLBLD CKD-EPI 2021: 114 ML/MIN/1.73M2
GLUCOSE SERPL-MCNC: 93 MG/DL (ref 65–139)
PHOSPHATE SERPL-MCNC: 4.1 MG/DL (ref 2.5–4.5)
POTASSIUM SERPL-SCNC: 4.6 MMOL/L (ref 3.5–5.3)
SODIUM SERPL-SCNC: 140 MMOL/L (ref 135–146)

## 2025-03-31 LAB
ALBUMIN SERPL-MCNC: 4.3 G/DL (ref 3.6–5.1)
BNP SERPL-MCNC: 16 PG/ML
BUN SERPL-MCNC: 14 MG/DL (ref 7–25)
BUN/CREAT SERPL: NORMAL (CALC) (ref 6–22)
CALCIUM SERPL-MCNC: 9.1 MG/DL (ref 8.6–10.2)
CHLORIDE SERPL-SCNC: 104 MMOL/L (ref 98–110)
CO2 SERPL-SCNC: 28 MMOL/L (ref 20–32)
CREAT SERPL-MCNC: 0.6 MG/DL (ref 0.5–0.99)
EGFRCR SERPLBLD CKD-EPI 2021: 114 ML/MIN/1.73M2
GLUCOSE SERPL-MCNC: 93 MG/DL (ref 65–139)
PHOSPHATE SERPL-MCNC: 4.1 MG/DL (ref 2.5–4.5)
POTASSIUM SERPL-SCNC: 4.6 MMOL/L (ref 3.5–5.3)
SODIUM SERPL-SCNC: 140 MMOL/L (ref 135–146)

## 2025-04-01 DIAGNOSIS — E78.2 MIXED HYPERLIPIDEMIA: ICD-10-CM

## 2025-04-02 LAB
CHOLEST SERPL-MCNC: 224 MG/DL
CHOLEST/HDLC SERPL: 4.7 (CALC)
HDLC SERPL-MCNC: 48 MG/DL
LDLC SERPL CALC-MCNC: 145 MG/DL (CALC)
NONHDLC SERPL-MCNC: 176 MG/DL (CALC)
TRIGL SERPL-MCNC: 168 MG/DL

## 2025-04-04 ENCOUNTER — HOSPITAL ENCOUNTER (OUTPATIENT)
Dept: RADIOLOGY | Facility: HOSPITAL | Age: 45
Discharge: HOME | End: 2025-04-04
Payer: COMMERCIAL

## 2025-04-04 VITALS — SYSTOLIC BLOOD PRESSURE: 114 MMHG | DIASTOLIC BLOOD PRESSURE: 70 MMHG | OXYGEN SATURATION: 98 % | HEART RATE: 66 BPM

## 2025-04-04 DIAGNOSIS — I50.20 ACC/AHA STAGE B SYSTOLIC HEART FAILURE: ICD-10-CM

## 2025-04-04 PROCEDURE — 2500000004 HC RX 250 GENERAL PHARMACY W/ HCPCS (ALT 636 FOR OP/ED): Performed by: RADIOLOGY

## 2025-04-04 PROCEDURE — 2500000001 HC RX 250 WO HCPCS SELF ADMINISTERED DRUGS (ALT 637 FOR MEDICARE OP): Performed by: RADIOLOGY

## 2025-04-04 PROCEDURE — 2550000001 HC RX 255 CONTRASTS: Performed by: INTERNAL MEDICINE

## 2025-04-04 PROCEDURE — 75574 CT ANGIO HRT W/3D IMAGE: CPT

## 2025-04-04 RX ORDER — LORAZEPAM 2 MG/ML
0.5 INJECTION INTRAMUSCULAR EVERY 5 MIN PRN
Status: DISCONTINUED | OUTPATIENT
Start: 2025-04-04 | End: 2025-04-05 | Stop reason: HOSPADM

## 2025-04-04 RX ORDER — METOPROLOL TARTRATE 1 MG/ML
5 INJECTION, SOLUTION INTRAVENOUS ONCE AS NEEDED
Status: COMPLETED | OUTPATIENT
Start: 2025-04-04 | End: 2025-04-04

## 2025-04-04 RX ORDER — METOPROLOL TARTRATE 1 MG/ML
5 INJECTION, SOLUTION INTRAVENOUS ONCE AS NEEDED
Status: DISCONTINUED | OUTPATIENT
Start: 2025-04-04 | End: 2025-04-05 | Stop reason: HOSPADM

## 2025-04-04 RX ORDER — NITROGLYCERIN 0.4 MG/1
0.8 TABLET SUBLINGUAL ONCE
Status: COMPLETED | OUTPATIENT
Start: 2025-04-04 | End: 2025-04-04

## 2025-04-04 RX ORDER — METOPROLOL TARTRATE 100 MG/1
100 TABLET ORAL ONCE AS NEEDED
Status: COMPLETED | OUTPATIENT
Start: 2025-04-04 | End: 2025-04-04

## 2025-04-04 RX ORDER — METOPROLOL TARTRATE 1 MG/ML
5 INJECTION, SOLUTION INTRAVENOUS ONCE
Status: COMPLETED | OUTPATIENT
Start: 2025-04-04 | End: 2025-04-04

## 2025-04-04 RX ORDER — METOPROLOL TARTRATE 100 MG/1
100 TABLET ORAL ONCE
Status: DISCONTINUED | OUTPATIENT
Start: 2025-04-04 | End: 2025-04-05 | Stop reason: HOSPADM

## 2025-04-04 RX ADMIN — METOPROLOL TARTRATE 5 MG: 5 INJECTION, SOLUTION INTRAVENOUS at 11:03

## 2025-04-04 RX ADMIN — METOPROLOL TARTRATE 100 MG: 100 TABLET ORAL at 10:05

## 2025-04-04 RX ADMIN — METOPROLOL TARTRATE 5 MG: 5 INJECTION, SOLUTION INTRAVENOUS at 11:08

## 2025-04-04 RX ADMIN — NITROGLYCERIN 0.8 MG: 0.4 TABLET SUBLINGUAL at 11:09

## 2025-04-04 RX ADMIN — IOHEXOL 90 ML: 350 INJECTION, SOLUTION INTRAVENOUS at 11:21

## 2025-04-04 ASSESSMENT — PAIN SCALES - GENERAL: PAINLEVEL_OUTOF10: 0 - NO PAIN

## 2025-04-04 ASSESSMENT — PAIN - FUNCTIONAL ASSESSMENT: PAIN_FUNCTIONAL_ASSESSMENT: 0-10

## 2025-04-07 DIAGNOSIS — I42.9 CARDIOMYOPATHY, UNSPECIFIED TYPE (MULTI): ICD-10-CM

## 2025-04-07 RX ORDER — METOPROLOL SUCCINATE 50 MG/1
50 TABLET, EXTENDED RELEASE ORAL DAILY
Qty: 30 TABLET | Refills: 11 | Status: SHIPPED | OUTPATIENT
Start: 2025-04-07 | End: 2026-04-07

## 2025-05-02 ENCOUNTER — OFFICE VISIT (OUTPATIENT)
Dept: PRIMARY CARE | Facility: CLINIC | Age: 45
End: 2025-05-02
Payer: COMMERCIAL

## 2025-05-02 VITALS
DIASTOLIC BLOOD PRESSURE: 80 MMHG | HEART RATE: 104 BPM | OXYGEN SATURATION: 99 % | SYSTOLIC BLOOD PRESSURE: 122 MMHG | RESPIRATION RATE: 18 BRPM | WEIGHT: 242 LBS | TEMPERATURE: 98.5 F | BODY MASS INDEX: 40.27 KG/M2

## 2025-05-02 DIAGNOSIS — R10.13 EPIGASTRIC PAIN: Primary | ICD-10-CM

## 2025-05-02 PROCEDURE — 1036F TOBACCO NON-USER: CPT | Performed by: NURSE PRACTITIONER

## 2025-05-02 PROCEDURE — 99213 OFFICE O/P EST LOW 20 MIN: CPT | Performed by: NURSE PRACTITIONER

## 2025-05-02 PROCEDURE — 3079F DIAST BP 80-89 MM HG: CPT | Performed by: NURSE PRACTITIONER

## 2025-05-02 PROCEDURE — 3074F SYST BP LT 130 MM HG: CPT | Performed by: NURSE PRACTITIONER

## 2025-05-02 ASSESSMENT — ENCOUNTER SYMPTOMS
CONSTIPATION: 0
NAUSEA: 0
VOMITING: 1
ABDOMINAL PAIN: 1
DIARRHEA: 0

## 2025-05-02 NOTE — PROGRESS NOTES
Subjective   Mabel Benton is a 45 y.o. female who presents for Abdominal Pain.  Abdominal Pain  The current episode started today. Associated symptoms include vomiting (Pt states she induced vomiting d/t the discomfort.). Pertinent negatives include no constipation, diarrhea or nausea. Associated symptoms comments: PT induced vomiting. The pain is aggravated by eating. The pain is relieved by Vomiting.   Pt. States she had been on a fiber gummy but ran out recently and hasn't been taking it. She is having small Bms. Denies c/o heartburn. Reports she had 1 similar episode a few weeks ago that resolved on its own.   Review of Systems   Gastrointestinal:  Positive for abdominal pain (epigastric area) and vomiting (Pt states she induced vomiting d/t the discomfort.). Negative for constipation, diarrhea and nausea.     Objective   Physical Exam  Constitutional:       Appearance: Normal appearance. She is obese. She is not ill-appearing.   Cardiovascular:      Rate and Rhythm: Normal rate and regular rhythm.      Heart sounds: No murmur heard.  Pulmonary:      Effort: Pulmonary effort is normal.      Breath sounds: Normal breath sounds.   Abdominal:      General: There is distension.      Palpations: There is no shifting dullness, fluid wave, hepatomegaly, splenomegaly or mass.      Tenderness: There is abdominal tenderness in the epigastric area.      Comments: Very mild tenderness on deep palpation.   Neurological:      General: No focal deficit present.      Mental Status: She is alert and oriented to person, place, and time.     /80   Pulse 104   Temp 36.9 °C (98.5 °F)   Resp 18   Wt 110 kg (242 lb)   SpO2 99%   BMI 40.27 kg/m²   Assessment/Plan   Problem List Items Addressed This Visit    None  Visit Diagnoses         Epigastric pain    -  Primary    Suspect this is d/t retained stool. Advised pt to try a dose of M.O.M. and Gas-Ex.Increase water intake, exercise and resume fiber.          If not  improved next week, F/U w/ PCP.

## 2025-05-08 ENCOUNTER — APPOINTMENT (OUTPATIENT)
Dept: OPHTHALMOLOGY | Facility: CLINIC | Age: 45
End: 2025-05-08
Payer: COMMERCIAL

## 2025-05-16 DIAGNOSIS — I50.20 ACC/AHA STAGE B SYSTOLIC HEART FAILURE: Primary | ICD-10-CM

## 2025-05-21 ENCOUNTER — APPOINTMENT (OUTPATIENT)
Dept: PRIMARY CARE | Facility: CLINIC | Age: 45
End: 2025-05-21
Payer: COMMERCIAL

## 2025-05-28 ENCOUNTER — APPOINTMENT (OUTPATIENT)
Dept: PRIMARY CARE | Facility: CLINIC | Age: 45
End: 2025-05-28
Payer: COMMERCIAL

## 2025-06-20 ENCOUNTER — APPOINTMENT (OUTPATIENT)
Dept: PRIMARY CARE | Facility: CLINIC | Age: 45
End: 2025-06-20
Payer: COMMERCIAL

## 2025-06-20 VITALS
HEIGHT: 65 IN | TEMPERATURE: 98.1 F | HEART RATE: 80 BPM | DIASTOLIC BLOOD PRESSURE: 78 MMHG | RESPIRATION RATE: 20 BRPM | SYSTOLIC BLOOD PRESSURE: 110 MMHG | BODY MASS INDEX: 39.15 KG/M2 | WEIGHT: 235 LBS

## 2025-06-20 DIAGNOSIS — I10 PRIMARY HYPERTENSION: ICD-10-CM

## 2025-06-20 DIAGNOSIS — E66.01 MORBID OBESITY WITH BMI OF 40.0-44.9, ADULT (MULTI): ICD-10-CM

## 2025-06-20 DIAGNOSIS — Z00.00 HEALTHCARE MAINTENANCE: ICD-10-CM

## 2025-06-20 DIAGNOSIS — R10.13 EPIGASTRIC PAIN: Primary | ICD-10-CM

## 2025-06-20 DIAGNOSIS — I51.4 MYOCARDIAL FIBROSIS (MULTI): ICD-10-CM

## 2025-06-20 DIAGNOSIS — I42.9 CARDIOMYOPATHY, UNSPECIFIED TYPE (MULTI): ICD-10-CM

## 2025-06-20 PROCEDURE — 99214 OFFICE O/P EST MOD 30 MIN: CPT | Performed by: FAMILY MEDICINE

## 2025-06-20 RX ORDER — PANTOPRAZOLE SODIUM 40 MG/1
40 TABLET, DELAYED RELEASE ORAL DAILY
Qty: 30 TABLET | Refills: 2 | Status: SHIPPED | OUTPATIENT
Start: 2025-06-20 | End: 2025-09-18

## 2025-06-20 RX ORDER — SUCRALFATE 1 G/1
1 TABLET ORAL
Qty: 60 TABLET | Refills: 0 | Status: SHIPPED | OUTPATIENT
Start: 2025-06-20 | End: 2025-07-20

## 2025-06-20 ASSESSMENT — ENCOUNTER SYMPTOMS
ABDOMINAL PAIN: 1
WHEEZING: 0
BLOOD IN STOOL: 0
FEVER: 0
NAUSEA: 0
DIFFICULTY URINATING: 0
ABDOMINAL DISTENTION: 1
DIARRHEA: 0
PALPITATIONS: 0
COUGH: 0
HEMATURIA: 0
VOMITING: 1
SHORTNESS OF BREATH: 0
CONSTIPATION: 1
FATIGUE: 0

## 2025-06-20 NOTE — PROGRESS NOTES
"Subjective   Patient ID: Mabel Benton is a 45 y.o. female who presents for Abdominal Pain (Pt gets upper abdominal pain/pressure intermittently for the past 4 months. She doesn't feel nauseous but she feels better if she forces her self to vomit. She did go to [facility] on 5/2/25 for this. She advised milk of magnesia, gas x, exercise, and fiber.).    HPI     Review of Systems   Constitutional:  Negative for fatigue and fever.        Pt seen for abd pain upper abd intermittent x few months  Dx constipation, occurs every few week. Rec MOM, gas x increase flds and fiber  Pt admits to inducing vomiting, bloating  Unknown triggers   Respiratory:  Negative for cough, shortness of breath and wheezing.    Cardiovascular:  Negative for chest pain and palpitations.   Gastrointestinal:  Positive for abdominal distention, abdominal pain, constipation and vomiting. Negative for blood in stool, diarrhea and nausea.        Pt has epigastric pain , can last a few hrs  Doesn't necessarily happen right after eating  Pt self induces voming   Genitourinary:  Negative for difficulty urinating, hematuria, vaginal bleeding and vaginal discharge.       Objective   /78   Pulse 80   Temp 36.7 °C (98.1 °F)   Resp 20   Ht 1.651 m (5' 5\")   Wt 107 kg (235 lb)   BMI 39.11 kg/m²     Physical Exam  Vitals and nursing note reviewed.   Constitutional:       General: She is not in acute distress.     Appearance: Normal appearance.   Cardiovascular:      Rate and Rhythm: Normal rate and regular rhythm.      Heart sounds: Normal heart sounds.   Pulmonary:      Effort: Pulmonary effort is normal.      Breath sounds: Normal breath sounds.   Abdominal:      General: Abdomen is flat. Bowel sounds are normal.      Palpations: There is no mass.      Tenderness: There is no abdominal tenderness. There is no right CVA tenderness, left CVA tenderness, guarding or rebound.   Skin:     General: Skin is warm and dry.   Neurological:      " Mental Status: She is alert.         Assessment/Plan   Problem List Items Addressed This Visit           ICD-10-CM    Morbid obesity with BMI of 40.0-44.9, adult (Multi) E66.01, Z68.41    Advise healthy diet and exercise  Referred to nutritional counseling         Relevant Orders    Referral to Nutrition Services    Primary hypertension I10    Pt on metoprolol  Med well tolerated  Has been stable         Cardiomyopathy I42.9    Pt under the care of card  Has been stable         Myocardial fibrosis (Multi) I51.4    Pt under the care of card  Has been stable         Epigastric pain - Primary R10.13    Advised pt to get h pylori breath test   Will start pt on pantoprazole, carafate   Pt to switch MOM to miralax, cont fiber, increase flds  Keep food diary and eliminate any triggers aime lactose and gluten  F/up in 12 wk  Go to ER if worsening symptoms         Relevant Medications    sucralfate (Carafate) 1 gram tablet    pantoprazole (ProtoNix) 40 mg EC tablet    Other Relevant Orders    H. Pylori Breath Test    Follow Up In [facility] - PCP - Established    Healthcare maintenance Z00.00    Relevant Orders    Follow Up In [facility] - PCP - Health Maintenance

## 2025-06-20 NOTE — ASSESSMENT & PLAN NOTE
Advised pt to get h pylori breath test   Will start pt on pantoprazole, carafate   Pt to switch MOM to miralax, cont fiber, increase flds  Keep food diary and eliminate any triggers aime lactose and gluten  F/up in 12 wk  Go to ER if worsening symptoms

## 2025-06-23 LAB — UREA BREATH TEST QL: NOT DETECTED

## 2025-07-08 DIAGNOSIS — Z12.31 ENCOUNTER FOR SCREENING MAMMOGRAM FOR BREAST CANCER: ICD-10-CM

## 2025-07-09 ENCOUNTER — TELEPHONE (OUTPATIENT)
Dept: CARDIOLOGY | Facility: HOSPITAL | Age: 45
End: 2025-07-09
Payer: COMMERCIAL

## 2025-07-09 NOTE — TELEPHONE ENCOUNTER
Call placed to complete heart failure social work assessment, left voicemail requesting call back.

## 2025-07-13 DIAGNOSIS — R10.13 EPIGASTRIC PAIN: ICD-10-CM

## 2025-07-14 RX ORDER — SUCRALFATE 1 G/1
1 TABLET ORAL
Qty: 180 TABLET | Refills: 1 | OUTPATIENT
Start: 2025-07-14 | End: 2025-08-13

## 2025-07-14 RX ORDER — PANTOPRAZOLE SODIUM 40 MG/1
40 TABLET, DELAYED RELEASE ORAL DAILY
Qty: 90 TABLET | Refills: 1 | OUTPATIENT
Start: 2025-07-14

## 2025-07-23 ENCOUNTER — TELEPHONE (OUTPATIENT)
Dept: CARDIOLOGY | Facility: HOSPITAL | Age: 45
End: 2025-07-23
Payer: COMMERCIAL

## 2025-07-23 NOTE — TELEPHONE ENCOUNTER
Call placed to complete heart failure social work assessment.  Left voicemail requesting call back.

## 2025-08-08 ENCOUNTER — APPOINTMENT (OUTPATIENT)
Dept: CARDIOLOGY | Facility: CLINIC | Age: 45
End: 2025-08-08
Payer: COMMERCIAL

## 2025-08-08 VITALS
BODY MASS INDEX: 39.62 KG/M2 | DIASTOLIC BLOOD PRESSURE: 82 MMHG | WEIGHT: 237.8 LBS | HEIGHT: 65 IN | SYSTOLIC BLOOD PRESSURE: 118 MMHG | HEART RATE: 73 BPM

## 2025-08-08 DIAGNOSIS — I42.9 CARDIOMYOPATHY, UNSPECIFIED TYPE (MULTI): ICD-10-CM

## 2025-08-08 DIAGNOSIS — I10 PRIMARY HYPERTENSION: ICD-10-CM

## 2025-08-08 DIAGNOSIS — I50.9: Primary | ICD-10-CM

## 2025-08-08 DIAGNOSIS — E78.2 MIXED HYPERLIPIDEMIA: ICD-10-CM

## 2025-08-08 DIAGNOSIS — I51.4 MYOCARDIAL FIBROSIS (MULTI): ICD-10-CM

## 2025-08-08 PROCEDURE — 3074F SYST BP LT 130 MM HG: CPT | Performed by: INTERNAL MEDICINE

## 2025-08-08 PROCEDURE — 1036F TOBACCO NON-USER: CPT | Performed by: INTERNAL MEDICINE

## 2025-08-08 PROCEDURE — 3008F BODY MASS INDEX DOCD: CPT | Performed by: INTERNAL MEDICINE

## 2025-08-08 PROCEDURE — 3079F DIAST BP 80-89 MM HG: CPT | Performed by: INTERNAL MEDICINE

## 2025-08-08 PROCEDURE — 99213 OFFICE O/P EST LOW 20 MIN: CPT | Performed by: INTERNAL MEDICINE

## 2025-08-08 NOTE — PROGRESS NOTES
"  Patient:  Mabel Benton  YOB: 1980  MRN: [id number]       Chief Complaint/Active Symptoms:       Mabel Benton is a 45 y.o. female who returns today for cardiac follow-up CM, HTN. Seen by advanced HF and felt to be most likely related to viral myocarditis.     Seen by advanced HF and changed over to Entresto. Home BP is normal and occasionally high at home -(just over 120/80)    No chest pain or discomfort. No further shortness of breath. No palpitations, no dizziness or lightheadedness. No longer feels or is aware of her heart beat.     Review of Systems   All other systems reviewed and are negative.      Objective:     Vitals:    08/08/25 0858   BP: 138/90   Pulse: 73       Vitals:    08/08/25 0858   Weight: 108 kg (237 lb 12.8 oz)     Vitals:    08/08/25 0858 08/08/25 0935   BP: 138/90 118/82   BP Location: Left arm Left arm   Patient Position: Sitting Sitting   Pulse: 73    Weight: 108 kg (237 lb 12.8 oz)    Height: 1.651 m (5' 5\")       Allergies:     Allergies[1]       Medications:     Current Outpatient Medications   Medication Instructions    inulin (Fiber Gummies) 2 gram tablet,chewable Chew.    metoprolol succinate XL (TOPROL-XL) 50 mg, oral, Daily    multivitamin tablet 1 tablet, Daily    pantoprazole (PROTONIX) 40 mg, oral, Daily, Do not crush, chew, or split.    sacubitriL-valsartan (Entresto)  mg tablet 1 tablet, oral, 2 times daily       Physical Examination:   GENERAL:  Well developed, well nourished, in no acute distress.  HEENT: NC AT, EOMI with anicteric sclera  NECK:  Supple, no elevated JVD, no bruit.  CHEST:  Symmetric and nontender.  LUNGS:  Clear to auscultation bilaterally, normal respiratory effort.  HEART:  PMI is not palpable. RRR with normal S1 and S2, no S3, no mumur or rub. No carotid or abdominal bruits  EXTREMITIES:  Warm with good color, no clubbing or cyanosis.  There is no edema noted.  PERIPHERAL VASCULAR:  Pulses present and equally palpable; 2+ " "throughout.  MUSCULOSKELETAL: No significant joint or limb deformity  NEURO/PSYCH:  Alert and oriented times three with approppriate behavior and responses. Nonfocal motor examination with normal gait and ambulation  Lymph: No significant palpable lymphadenopathy  Skin: no rash or lesions on exposed skin or reported.    Lab:     CBC:   Lab Results   Component Value Date    WBC 7.6 09/23/2024    RBC 4.61 09/23/2024    HGB 12.8 09/23/2024    HCT 39.9 09/23/2024     09/23/2024        CMP:    Lab Results   Component Value Date     03/29/2025    K 4.6 03/29/2025     03/29/2025    CO2 28 03/29/2025    BUN 14 03/29/2025    CREATININE 0.60 03/29/2025    GLUCOSE 93 03/29/2025    CALCIUM 9.1 03/29/2025       Magnesium:    No results found for: \"MG\"    Lipid Profile:    Lab Results   Component Value Date    TRIG 168 (H) 04/02/2025    HDL 48 (L) 04/02/2025    LDLCALC 145 (H) 04/02/2025       TSH:    Lab Results   Component Value Date    TSH 0.94 09/23/2024       BNP:   Lab Results   Component Value Date    BNP 16 03/29/2025        PT/INR:    No results found for: \"PROTIME\", \"INR\"    HgBA1c:    Lab Results   Component Value Date    HGBA1C 5.2 09/23/2024       BMP:  Lab Results   Component Value Date     03/29/2025     09/23/2024     05/30/2019     05/21/2019    K 4.6 03/29/2025    K 4.4 09/23/2024    K 4.5 05/30/2019    K 4.4 05/21/2019     03/29/2025     09/23/2024     05/30/2019     05/21/2019    CO2 28 03/29/2025    CO2 24 09/23/2024    CO2 22 05/30/2019    CO2 25 05/21/2019    BUN 14 03/29/2025    BUN 11 09/23/2024    BUN 12 05/30/2019    BUN 12 05/21/2019    CREATININE 0.60 03/29/2025    CREATININE 0.67 09/23/2024    CREATININE 0.77 05/30/2019    CREATININE 0.61 05/21/2019       Cardiac Enzymes:    Lab Results   Component Value Date    TROPHS <3 02/08/2025       Hepatic Function Panel:    Lab Results   Component Value Date    ALKPHOS 52 09/23/2024    ALT 8 " 09/23/2024    AST 9 09/23/2024    PROT 6.9 09/23/2024    BILITOT 0.5 09/23/2024     Labs reviewed    Diagnostic Studies:     Transthoracic Echo Limited  Result Date: 10/22/2024   [facility], [address] [place] [address]             Tel [phone] and Fax [phone] TRANSTHORACIC ECHOCARDIOGRAM REPORT  Patient Name:      KRAIG VIRAL      Reading Physician:    [id number] Juan Pablo Neff MD Study Date:        10/22/2024           Ordering Provider:    [id number] SAMMY BUIA MRN/PID:           [id number]             Fellow: Accession#:        [id number]         Nurse: Date of Birth/Age: 1980 / 44 years Sonographer:          Kellen Babb RDCS Gender:            F                    Additional Staff: Height:            165.10 cm            Admit Date: Weight:            120.20 kg            Admission Status:     Outpatient BSA / BMI:         2.23 m2 / 44.10      Encounter#:           [id number]                    kg/m2 Blood Pressure:    152/89 mmHg          Department Location:  [facility] Study Type:    TRANSTHORACIC ECHO (TTE) LIMITED Diagnosis/ICD: Ventricular enlargement-I51.7 Indication:    Cardiomegaly CPT Code:      Echo Limited-08866; Color Doppler-39357; Doppler Limited-62542 Patient History: Pertinent History: HTN and Hyperlipidemia. obesity. Study Detail: The following Echo studies were performed: 2D, M-Mode, Doppler and               color flow. Technically challenging study due to body habitus.  PHYSICIAN INTERPRETATION: Left Ventricle: The left ventricular systolic function is mildly decreased, with a Bah's biplane calculated ejection fraction of 41%. There is global hypokinesis of the left ventricle with minor regional variations. The left ventricular cavity size is mildly dilated. Spectral Doppler shows a normal pattern of left ventricular diastolic  filling. Left Atrium: The left atrium is normal in size. Right Ventricle: The right ventricle is normal in size. There is normal right ventricular global systolic function. Right Atrium: The right atrium is normal in size. Aortic Valve: The aortic valve is trileaflet. There is no evidence of aortic valve regurgitation. The peak instantaneous gradient of the aortic valve is 7.3 mmHg. Mitral Valve: The mitral valve is normal in structure. There is trace to mild mitral valve regurgitation. Tricuspid Valve: The tricuspid valve is structurally normal. There is trace to mild tricuspid regurgitation. The Doppler estimated RVSP is slightly elevated at 32.5 mmHg. Pulmonic Valve: The pulmonic valve was not assessed. Pulmonic valve regurgitation was not assessed. Pericardium: There is no pericardial effusion noted. Aorta: The aortic root is abnormal. There is mild dilatation of the aortic root. Systemic Veins: The inferior vena cava appears normal in size, with IVC inspiratory collapse greater than 50%. In comparison to the previous echocardiogram(s): There are no prior studies on this patient for comparison purposes.  CONCLUSIONS:  1. The left ventricular systolic function is mildly decreased, with a Bah's biplane calculated ejection fraction of 41%.  2. There is global hypokinesis of the left ventricle with minor regional variations.  3. Left ventricular cavity size is mildly dilated.  4. There is normal right ventricular global systolic function.  5. Slightly elevated right ventricular systolic pressure. QUANTITATIVE DATA SUMMARY:  2D MEASUREMENTS:          Normal Ranges: Ao Root d:       3.80 cm  (2.0-3.7cm) LAs:             4.11 cm  (2.7-4.0cm) RVIDd:           3.13 cm  (0.9-3.6cm) IVSd:            1.01 cm  (0.6-1.1cm) LVPWd:           0.73 cm  (0.6-1.1cm) LVIDd:           5.26 cm  (3.9-5.9cm) LVIDs:           4.31 cm LV Mass Index:   74 g/m2 LVEDV Index:     77 ml/m2 LV % FS          18.0 %  LA VOLUME:                     Normal Ranges: LA Vol A4C:        64.2 ml    (22+/-6mL/m2) LA Vol A2C:        68.8 ml LA Vol BP:         67.7 ml LA Vol Index A4C:  28.8ml/m2 LA Vol Index A2C:  30.9 ml/m2 LA Vol Index BP:   30.4 ml/m2 LA Area A4C:       20.0 cm2 LA Area A2C:       21.1 cm2 LA Major Axis A4C: 5.3 cm LA Major Axis A2C: 5.5 cm LA Vol A4C:        56.9 ml LA Vol A2C:        66.2 ml LA Vol Index BSA:  27.6 ml/m2  RA VOLUME BY A/L METHOD:          Normal Ranges: RA Area A4C:             15.5 cm2  M-MODE MEASUREMENTS:         Normal Ranges: AoV Exc:             2.20 cm (1.5-2.5cm)  AORTA MEASUREMENTS:         Normal Ranges: AoV Exc:            2.20 cm (1.5-2.5cm) Asc Ao, d:          3.30 cm (2.1-3.4cm)  LV SYSTOLIC FUNCTION BY 2D PLANIMETRY (MOD):                      Normal Ranges: EF-A4C View:    48 % (>=55%) EF-A2C View:    32 % EF-Biplane:     41 % LV EF Reported: 41 %  LV DIASTOLIC FUNCTION:             Normal Ranges: MV Peak E:             0.77 m/s    (0.7-1.2 m/s) MV Peak A:             0.90 m/s    (0.42-0.7 m/s) E/A Ratio:             0.85        (1.0-2.2) MV e'                  0.065 m/s   (>8.0) MV lateral e'          0.07 m/s MV medial e'           0.06 m/s MV A Dur:              103.81 msec E/e' Ratio:            11.82       (<8.0) a'                     0.12 m/s MV DT:                 81 msec     (150-240 msec)  MITRAL VALVE:         Normal Ranges: MV DT:        81 msec (150-240msec)  AORTIC VALVE:           Normal Ranges: AoV Vmax:      1.35 m/s (<=1.7m/s) AoV Peak P.3 mmHg (<20mmHg) LVOT Max Cristobal:  1.16 m/s (<=1.1m/s) LVOT VTI:      22.12 cm LVOT Diameter: 2.38 cm  (1.8-2.4cm) AoV Area,Vmax: 3.83 cm2 (2.5-4.5cm2)  RIGHT VENTRICLE: RV Basal 4.00 cm RV Mid   3.10 cm RV Major 6.5 cm TAPSE:   23.0 mm RV s'    0.12 m/s  TRICUSPID VALVE/RVSP:          Normal Ranges: Peak TR Velocity:     2.72 m/s RV Syst Pressure:     33 mmHg  (< 30mmHg) IVC Diam:             1.90 cm  PULMONIC VALVE:          Normal Ranges: PV Max Cristobal:      1.1 m/s  (0.6-0.9m/s) PV Max P.1 mmHg  AORTA: Asc Ao Diam 3.35 cm  [id number] Juan Pablo Neff MD Electronically signed on 10/22/2024 at 9:48:34 AM  ** Final **     Cardiovascular testing reviewed    Radiology:     No orders to display   CT coronary angiography reviewed no significant plaque      ASSESSMENT     Problem List Items Addressed This Visit       Primary hypertension    Relevant Orders    Basic Metabolic Panel    Mixed hyperlipidemia    Cardiomyopathy    Relevant Orders    B-Type Natriuretic Peptide    TSH with reflex to Free T4 if abnormal    Magnesium    Myocardial fibrosis (Multi)    ACC/AHA stage B congestive heart failure (pre-heart failure) - Primary    Relevant Orders    Basic Metabolic Panel       PLAN   1.  Stage P congestive heart failure.  Doing well on the current medical regimen.  She is scheduled to see advanced heart failure in September.  Question whether she would benefit from adding an SGLT2 inhibitor or a GLP-1 inhibitor.  We both improved cardiovascular outcomes, the GLP-1 inhibitor would have the added benefit of helping her achieve a healthier body weight.  The patient is not specifically interested in the GLP-1 inhibitor for weight loss but would listen to any recommendations in terms of the improved cardiovascular outcomes.  2.  Hypertension.  Blood pressures are controlled on her current medical regimen.  3.  Mixed hyperlipidemia.  That could improve with weight loss and further dietary changes.  Would have a low threshold for initiating statin therapy but no significant obstructive lesion seen.    Depending on how often should be seeing the advanced heart failure team will see her every 6 months or annually.  She knows to contact me at any time if there is any problems or concerns.                     [1] No Known Allergies

## 2025-08-18 ENCOUNTER — APPOINTMENT (OUTPATIENT)
Dept: NUTRITION | Facility: CLINIC | Age: 45
End: 2025-08-18
Payer: COMMERCIAL

## 2025-08-18 VITALS — BODY MASS INDEX: 39.57 KG/M2 | HEIGHT: 65 IN

## 2025-08-18 DIAGNOSIS — E66.01 MORBID OBESITY WITH BMI OF 40.0-44.9, ADULT (MULTI): ICD-10-CM

## 2025-08-18 DIAGNOSIS — Z71.3 DIETARY COUNSELING AND SURVEILLANCE: Primary | ICD-10-CM

## 2025-08-18 PROCEDURE — 97802 MEDICAL NUTRITION INDIV IN: CPT | Performed by: DIETITIAN, REGISTERED

## 2025-09-22 ENCOUNTER — APPOINTMENT (OUTPATIENT)
Dept: CARDIOLOGY | Facility: CLINIC | Age: 45
End: 2025-09-22
Payer: COMMERCIAL

## 2025-09-26 ENCOUNTER — APPOINTMENT (OUTPATIENT)
Dept: PRIMARY CARE | Facility: CLINIC | Age: 45
End: 2025-09-26
Payer: COMMERCIAL

## 2026-02-13 ENCOUNTER — APPOINTMENT (OUTPATIENT)
Dept: CARDIOLOGY | Facility: CLINIC | Age: 46
End: 2026-02-13
Payer: COMMERCIAL